# Patient Record
Sex: FEMALE | Race: WHITE
[De-identification: names, ages, dates, MRNs, and addresses within clinical notes are randomized per-mention and may not be internally consistent; named-entity substitution may affect disease eponyms.]

---
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
## 2018-01-21 ENCOUNTER — HOSPITAL ENCOUNTER (EMERGENCY)
Dept: HOSPITAL 53 - M ED | Age: 18
Discharge: HOME | End: 2018-01-21
Payer: COMMERCIAL

## 2018-01-21 DIAGNOSIS — N94.6: ICD-10-CM

## 2018-01-21 DIAGNOSIS — N93.9: Primary | ICD-10-CM

## 2018-01-21 DIAGNOSIS — K21.9: ICD-10-CM

## 2018-01-21 DIAGNOSIS — Z79.899: ICD-10-CM

## 2018-01-21 RX ADMIN — ONDANSETRON 1 MG: 4 TABLET, ORALLY DISINTEGRATING ORAL at 18:12

## 2018-01-21 RX ADMIN — KETOROLAC TROMETHAMINE 1 MG: 30 INJECTION, SOLUTION INTRAMUSCULAR at 18:12

## 2018-02-11 ENCOUNTER — HOSPITAL ENCOUNTER (EMERGENCY)
Dept: HOSPITAL 53 - M ED | Age: 18
LOS: 1 days | Discharge: HOME | End: 2018-02-12
Payer: COMMERCIAL

## 2018-02-11 DIAGNOSIS — F41.1: Primary | ICD-10-CM

## 2018-02-11 DIAGNOSIS — K21.0: ICD-10-CM

## 2018-02-11 DIAGNOSIS — R30.0: ICD-10-CM

## 2018-02-11 DIAGNOSIS — Z79.899: ICD-10-CM

## 2018-02-11 DIAGNOSIS — J45.909: ICD-10-CM

## 2018-02-11 PROCEDURE — 84703 CHORIONIC GONADOTROPIN ASSAY: CPT

## 2018-02-11 RX ADMIN — ACETAMINOPHEN 1 MG: 325 TABLET ORAL at 23:53

## 2018-02-12 LAB
CONTROL LINE UCG: (no result)
LEUKOCYTE ESTERASE UR AUTO RFX: NEGATIVE
SPECIFIC GRAVITY UR AUTO RFX: 1.02 (ref 1–1.03)
SQUAM EPITHELIAL CELL UR AURFX: 4 /HPF (ref 0–6)
URINE PREG TEST: NEGATIVE

## 2018-02-28 ENCOUNTER — HOSPITAL ENCOUNTER (EMERGENCY)
Dept: HOSPITAL 53 - M ED | Age: 18
Discharge: HOME | End: 2018-02-28
Payer: COMMERCIAL

## 2018-02-28 DIAGNOSIS — K21.9: ICD-10-CM

## 2018-02-28 DIAGNOSIS — O20.8: Primary | ICD-10-CM

## 2018-02-28 DIAGNOSIS — O99.511: ICD-10-CM

## 2018-02-28 DIAGNOSIS — O99.611: ICD-10-CM

## 2018-02-28 DIAGNOSIS — J45.909: ICD-10-CM

## 2018-02-28 DIAGNOSIS — Z79.899: ICD-10-CM

## 2018-02-28 DIAGNOSIS — O34.81: ICD-10-CM

## 2018-02-28 DIAGNOSIS — F33.9: ICD-10-CM

## 2018-02-28 DIAGNOSIS — Z3A.01: ICD-10-CM

## 2018-02-28 DIAGNOSIS — O99.341: ICD-10-CM

## 2018-02-28 DIAGNOSIS — N83.202: ICD-10-CM

## 2018-02-28 DIAGNOSIS — F41.9: ICD-10-CM

## 2018-02-28 LAB
ALBUMIN/GLOBULIN RATIO: 1.32 (ref 1–1.93)
ALBUMIN: 4.1 GM/DL (ref 3.2–5.2)
ALKALINE PHOSPHATASE: 77 U/L (ref 45–117)
ALT SERPL W P-5'-P-CCNC: 20 U/L (ref 12–78)
ANION GAP: 10 MEQ/L (ref 8–16)
AST SERPL-CCNC: 16 U/L (ref 7–37)
BASO #: 0 10^3/UL (ref 0–0.2)
BASO %: 0.5 % (ref 0–1)
BILIRUBIN,TOTAL: 0.7 MG/DL (ref 0.2–1)
BLOOD UREA NITROGEN: 5 MG/DL (ref 7–18)
CALCIUM LEVEL: 8.9 MG/DL (ref 8.5–10.1)
CARBON DIOXIDE LEVEL: 21 MEQ/L (ref 21–32)
CHLORIDE LEVEL: 108 MEQ/L (ref 98–107)
CREATININE FOR GFR: 0.59 MG/DL (ref 0.55–1.3)
EOS #: 0.1 10^3/UL (ref 0–0.5)
EOSINOPHIL NFR BLD AUTO: 0.7 % (ref 0–3)
GLUCOSE, FASTING: 93 MG/DL (ref 70–100)
HCG, SERUM QUANTITATIVE: 8955 MIU/ML
HEMATOCRIT: 38.3 % (ref 36–47)
HEMOGLOBIN: 12.8 G/DL (ref 12–16)
IMMATURE GRANULOCYTE %: 0.1 % (ref 0–3)
LEUKOCYTE ESTERASE UR AUTO RFX: NEGATIVE
LYMPH #: 2 10^3/UL (ref 1.5–6.5)
LYMPH %: 25.3 % (ref 24–44)
MEAN CORPUSCULAR HEMOGLOBIN: 28.1 PG (ref 27–33)
MEAN CORPUSCULAR HGB CONC: 33.4 G/DL (ref 32–36.5)
MEAN CORPUSCULAR VOLUME: 84 FL (ref 80–96)
MONO #: 0.8 10^3/UL (ref 0–0.8)
MONO %: 9.7 % (ref 0–5)
NEUTROPHILS #: 5.1 10^3/UL (ref 1.8–7.7)
NEUTROPHILS %: 63.7 % (ref 36–66)
NRBC BLD AUTO-RTO: 0 % (ref 0–0)
PLATELET COUNT, AUTOMATED: 375 10^3/UL (ref 150–450)
POTASSIUM SERUM: 4 MEQ/L (ref 3.5–5.1)
RED BLOOD COUNT: 4.56 10^6/UL (ref 4–5.4)
RED CELL DISTRIBUTION WIDTH: 14.3 % (ref 11.5–14.5)
SODIUM LEVEL: 139 MEQ/L (ref 136–145)
SPECIFIC GRAVITY UR AUTO RFX: 1.02 (ref 1–1.03)
SQUAM EPITHELIAL CELL UR AURFX: 20 /HPF (ref 0–6)
TOTAL PROTEIN: 7.2 GM/DL (ref 6.4–8.2)
WHITE BLOOD COUNT: 8 10^3/UL (ref 4–10)

## 2018-02-28 PROCEDURE — 76801 OB US < 14 WKS SINGLE FETUS: CPT

## 2018-02-28 RX ADMIN — SODIUM CHLORIDE 1 MLS/HR: 9 INJECTION, SOLUTION INTRAVENOUS at 11:30

## 2018-03-02 ENCOUNTER — HOSPITAL ENCOUNTER (EMERGENCY)
Dept: HOSPITAL 53 - M ED | Age: 18
Discharge: HOME | End: 2018-03-02
Payer: COMMERCIAL

## 2018-03-02 DIAGNOSIS — R51: ICD-10-CM

## 2018-03-02 DIAGNOSIS — J45.909: ICD-10-CM

## 2018-03-02 DIAGNOSIS — K21.9: ICD-10-CM

## 2018-03-02 DIAGNOSIS — E16.2: Primary | ICD-10-CM

## 2018-03-02 DIAGNOSIS — Z79.899: ICD-10-CM

## 2018-03-02 LAB — GLUCOSE BLDC GLUCOMTR-MCNC: 68 MG/DL (ref 70–105)

## 2018-03-02 PROCEDURE — 99284 EMERGENCY DEPT VISIT MOD MDM: CPT

## 2018-03-28 ENCOUNTER — HOSPITAL ENCOUNTER (EMERGENCY)
Dept: HOSPITAL 53 - M ED | Age: 18
LOS: 1 days | Discharge: HOME | End: 2018-03-29
Payer: COMMERCIAL

## 2018-03-28 DIAGNOSIS — O99.611: ICD-10-CM

## 2018-03-28 DIAGNOSIS — Z3A.09: ICD-10-CM

## 2018-03-28 DIAGNOSIS — O21.0: ICD-10-CM

## 2018-03-28 DIAGNOSIS — K21.9: ICD-10-CM

## 2018-03-28 DIAGNOSIS — O26.891: Primary | ICD-10-CM

## 2018-03-28 DIAGNOSIS — M94.0: ICD-10-CM

## 2018-03-28 LAB
BASO #: 0.1 10^3/UL (ref 0–0.2)
BASO %: 0.5 % (ref 0–1)
EOS #: 0.2 10^3/UL (ref 0–0.5)
EOSINOPHIL NFR BLD AUTO: 1.4 % (ref 0–3)
HEMATOCRIT: 44.8 % (ref 36–47)
HEMOGLOBIN: 14.5 G/DL (ref 12–16)
IMMATURE GRANULOCYTE %: 0.4 % (ref 0–3)
LYMPH #: 3 10^3/UL (ref 1.5–6.5)
LYMPH %: 27.7 % (ref 24–44)
MEAN CORPUSCULAR HEMOGLOBIN: 27.7 PG (ref 27–33)
MEAN CORPUSCULAR HGB CONC: 32.4 G/DL (ref 32–36.5)
MEAN CORPUSCULAR VOLUME: 85.7 FL (ref 80–96)
MONO #: 1.1 10^3/UL (ref 0–0.8)
MONO %: 10.1 % (ref 0–5)
NEUTROPHILS #: 6.5 10^3/UL (ref 1.8–7.7)
NEUTROPHILS %: 59.9 % (ref 36–66)
NRBC BLD AUTO-RTO: 0 % (ref 0–0)
PLATELET COUNT, AUTOMATED: 373 10^3/UL (ref 150–450)
RED BLOOD COUNT: 5.23 10^6/UL (ref 4–5.4)
RED CELL DISTRIBUTION WIDTH: 14.4 % (ref 11.5–14.5)
WHITE BLOOD COUNT: 10.8 10^3/UL (ref 4–10)

## 2018-03-28 RX ADMIN — ACETAMINOPHEN 1 MG: 325 TABLET ORAL at 23:06

## 2018-03-28 RX ADMIN — METOCLOPRAMIDE 1 MG: 5 INJECTION, SOLUTION INTRAMUSCULAR; INTRAVENOUS at 23:06

## 2018-03-28 RX ADMIN — SODIUM CHLORIDE 1 MLS/HR: 9 INJECTION, SOLUTION INTRAVENOUS at 23:06

## 2018-03-29 LAB
ALBUMIN/GLOBULIN RATIO: 1.19 (ref 1–1.93)
ALBUMIN: 3.7 GM/DL (ref 3.2–5.2)
ALKALINE PHOSPHATASE: 75 U/L (ref 45–117)
ALT SERPL W P-5'-P-CCNC: 46 U/L (ref 12–78)
ANION GAP: 6 MEQ/L (ref 8–16)
AST SERPL-CCNC: 26 U/L (ref 7–37)
BILIRUBIN,TOTAL: 0.5 MG/DL (ref 0.2–1)
BLOOD UREA NITROGEN: 7 MG/DL (ref 7–18)
CALCIUM LEVEL: 8.6 MG/DL (ref 8.5–10.1)
CARBON DIOXIDE LEVEL: 24 MEQ/L (ref 21–32)
CHLORIDE LEVEL: 108 MEQ/L (ref 98–107)
CREATININE FOR GFR: 0.59 MG/DL (ref 0.55–1.3)
GLUCOSE, FASTING: 100 MG/DL (ref 70–100)
POTASSIUM SERUM: 3.7 MEQ/L (ref 3.5–5.1)
SODIUM LEVEL: 138 MEQ/L (ref 136–145)
TOTAL PROTEIN: 6.8 GM/DL (ref 6.4–8.2)

## 2018-04-13 ENCOUNTER — HOSPITAL ENCOUNTER (EMERGENCY)
Dept: HOSPITAL 53 - M ED | Age: 18
LOS: 1 days | Discharge: HOME | End: 2018-04-14
Payer: COMMERCIAL

## 2018-04-13 DIAGNOSIS — O99.89: Primary | ICD-10-CM

## 2018-04-13 DIAGNOSIS — Z79.899: ICD-10-CM

## 2018-04-13 DIAGNOSIS — K21.9: ICD-10-CM

## 2018-04-13 DIAGNOSIS — R51: ICD-10-CM

## 2018-04-13 DIAGNOSIS — Z91.018: ICD-10-CM

## 2018-04-13 DIAGNOSIS — F41.9: ICD-10-CM

## 2018-04-13 DIAGNOSIS — Z91.013: ICD-10-CM

## 2018-04-13 DIAGNOSIS — Z3A.12: ICD-10-CM

## 2018-04-13 DIAGNOSIS — O99.341: ICD-10-CM

## 2018-04-13 LAB
ALBUMIN/GLOBULIN RATIO: 0.95 (ref 1–1.93)
ALBUMIN: 3.5 GM/DL (ref 3.2–5.2)
ALKALINE PHOSPHATASE: 72 U/L (ref 45–117)
ALT SERPL W P-5'-P-CCNC: 32 U/L (ref 12–78)
ANION GAP: 7 MEQ/L (ref 8–16)
AST SERPL-CCNC: 19 U/L (ref 7–37)
BASO #: 0 10^3/UL (ref 0–0.2)
BASO %: 0.3 % (ref 0–1)
BILIRUB CONJ SERPL-MCNC: < 0.1 MG/DL (ref 0–0.2)
BILIRUBIN,TOTAL: 0.4 MG/DL (ref 0.2–1)
BLOOD UREA NITROGEN: 7 MG/DL (ref 7–18)
CALCIUM LEVEL: 8.8 MG/DL (ref 8.5–10.1)
CARBON DIOXIDE LEVEL: 25 MEQ/L (ref 21–32)
CHLAMYDIA DNA AMPLIFICATION: NEGATIVE
CHLORIDE LEVEL: 107 MEQ/L (ref 98–107)
CREATININE FOR GFR: 0.53 MG/DL (ref 0.55–1.3)
EOS #: 0.1 10^3/UL (ref 0–0.5)
EOSINOPHIL NFR BLD AUTO: 0.7 % (ref 0–3)
GC DNA AMPLIFICATION: NEGATIVE
GLUCOSE, FASTING: 86 MG/DL (ref 70–100)
HCG, SERUM QUANTITATIVE: (no result) MIU/ML
HEMATOCRIT: 36.2 % (ref 36–47)
HEMOGLOBIN: 12.2 G/DL (ref 12–15.5)
IMMATURE GRANULOCYTE %: 0.3 % (ref 0–3)
LEUKOCYTE ESTERASE UR AUTO RFX: NEGATIVE
LIPASE: 101 U/L (ref 73–393)
LYMPH #: 2.3 10^3/UL (ref 1.5–6.5)
LYMPH %: 20 % (ref 24–44)
MEAN CORPUSCULAR HEMOGLOBIN: 28.5 PG (ref 27–33)
MEAN CORPUSCULAR HGB CONC: 33.7 G/DL (ref 32–36.5)
MEAN CORPUSCULAR VOLUME: 84.6 FL (ref 80–96)
MONO #: 1 10^3/UL (ref 0–0.8)
MONO %: 8.9 % (ref 0–5)
NEUTROPHILS #: 8.2 10^3/UL (ref 1.8–7.7)
NEUTROPHILS %: 69.8 % (ref 36–66)
NRBC BLD AUTO-RTO: 0 % (ref 0–0)
PLATELET COUNT, AUTOMATED: 316 10^3/UL (ref 150–450)
POTASSIUM SERUM: 3.6 MEQ/L (ref 3.5–5.1)
RED BLOOD COUNT: 4.28 10^6/UL (ref 4–5.4)
RED CELL DISTRIBUTION WIDTH: 14.2 % (ref 11.5–14.5)
SODIUM LEVEL: 139 MEQ/L (ref 136–145)
SPECIFIC GRAVITY UR AUTO RFX: 1 (ref 1–1.03)
SQUAM EPITHELIAL CELL UR AURFX: 0 /HPF (ref 0–6)
TOTAL PROTEIN: 7.2 GM/DL (ref 6.4–8.2)
WHITE BLOOD COUNT: 11.7 10^3/UL (ref 4–10)

## 2018-04-13 RX ADMIN — ACETAMINOPHEN 1 MG: 325 TABLET ORAL at 21:37

## 2018-04-13 RX ADMIN — SODIUM CHLORIDE 1 MLS/HR: 9 INJECTION, SOLUTION INTRAVENOUS at 21:37

## 2018-04-13 RX ADMIN — SODIUM CHLORIDE 1 MLS/HR: 9 INJECTION, SOLUTION INTRAVENOUS at 23:15

## 2018-04-13 RX ADMIN — DEXTROSE MONOHYDRATE 1 MG: 50 INJECTION, SOLUTION INTRAVENOUS at 21:37

## 2018-05-08 ENCOUNTER — HOSPITAL ENCOUNTER (EMERGENCY)
Dept: HOSPITAL 53 - M ED | Age: 18
Discharge: HOME | End: 2018-05-08
Payer: COMMERCIAL

## 2018-05-08 DIAGNOSIS — K21.9: ICD-10-CM

## 2018-05-08 DIAGNOSIS — O99.612: ICD-10-CM

## 2018-05-08 DIAGNOSIS — O99.512: ICD-10-CM

## 2018-05-08 DIAGNOSIS — Z3A.16: ICD-10-CM

## 2018-05-08 DIAGNOSIS — J45.909: ICD-10-CM

## 2018-05-08 DIAGNOSIS — Z91.018: ICD-10-CM

## 2018-05-08 DIAGNOSIS — Z91.013: ICD-10-CM

## 2018-05-08 DIAGNOSIS — Z79.899: ICD-10-CM

## 2018-05-08 DIAGNOSIS — O21.9: Primary | ICD-10-CM

## 2018-05-08 LAB
ALBUMIN/GLOBULIN RATIO: 0.97 (ref 1–1.93)
ALBUMIN: 3.2 GM/DL (ref 3.2–5.2)
ALKALINE PHOSPHATASE: 79 U/L (ref 45–117)
ALT SERPL W P-5'-P-CCNC: 31 U/L (ref 12–78)
ANION GAP: 9 MEQ/L (ref 8–16)
AST SERPL-CCNC: 21 U/L (ref 7–37)
BASO #: 0.1 10^3/UL (ref 0–0.2)
BASO %: 0.5 % (ref 0–1)
BILIRUB CONJ SERPL-MCNC: 0.1 MG/DL (ref 0–0.2)
BILIRUBIN,TOTAL: 0.4 MG/DL (ref 0.2–1)
BLOOD UREA NITROGEN: 4 MG/DL (ref 7–18)
CALCIUM LEVEL: 8.8 MG/DL (ref 8.5–10.1)
CARBON DIOXIDE LEVEL: 23 MEQ/L (ref 21–32)
CHLORIDE LEVEL: 109 MEQ/L (ref 98–107)
CREATININE FOR GFR: 0.54 MG/DL (ref 0.55–1.3)
EOS #: 0.1 10^3/UL (ref 0–0.5)
EOSINOPHIL NFR BLD AUTO: 1 % (ref 0–3)
GLUCOSE, FASTING: 97 MG/DL (ref 70–100)
HEMATOCRIT: 35.3 % (ref 36–47)
HEMOGLOBIN: 12 G/DL (ref 12–15.5)
IMMATURE GRANULOCYTE %: 0.4 % (ref 0–3)
LEUKOCYTE ESTERASE UR AUTO RFX: NEGATIVE
LIPASE: 95 U/L (ref 73–393)
LYMPH #: 2.8 10^3/UL (ref 1.5–6.5)
LYMPH %: 26.6 % (ref 24–44)
MEAN CORPUSCULAR HEMOGLOBIN: 28.5 PG (ref 27–33)
MEAN CORPUSCULAR HGB CONC: 34 G/DL (ref 32–36.5)
MEAN CORPUSCULAR VOLUME: 83.8 FL (ref 80–96)
MONO #: 1 10^3/UL (ref 0–0.8)
MONO %: 9.4 % (ref 0–5)
NEUTROPHILS #: 6.5 10^3/UL (ref 1.8–7.7)
NEUTROPHILS %: 62.1 % (ref 36–66)
NRBC BLD AUTO-RTO: 0 % (ref 0–0)
PLATELET COUNT, AUTOMATED: 292 10^3/UL (ref 150–450)
POTASSIUM SERUM: 3.9 MEQ/L (ref 3.5–5.1)
RED BLOOD COUNT: 4.21 10^6/UL (ref 4–5.4)
RED CELL DISTRIBUTION WIDTH: 13.7 % (ref 11.5–14.5)
SODIUM LEVEL: 141 MEQ/L (ref 136–145)
SPECIFIC GRAVITY UR AUTO RFX: 1 (ref 1–1.03)
SQUAM EPITHELIAL CELL UR AURFX: 2 /HPF (ref 0–6)
TOTAL PROTEIN: 6.5 GM/DL (ref 6.4–8.2)
WHITE BLOOD COUNT: 10.5 10^3/UL (ref 4–10)

## 2018-05-08 RX ADMIN — SODIUM CHLORIDE 1 MLS/HR: 9 INJECTION, SOLUTION INTRAVENOUS at 07:00

## 2018-05-08 RX ADMIN — ACETAMINOPHEN 1 MG: 325 TABLET ORAL at 07:56

## 2018-05-08 RX ADMIN — ONDANSETRON 1 MG: 2 INJECTION INTRAMUSCULAR; INTRAVENOUS at 06:45

## 2018-08-27 ENCOUNTER — HOSPITAL ENCOUNTER (EMERGENCY)
Dept: HOSPITAL 53 - M ED | Age: 18
Discharge: HOME | End: 2018-08-27
Payer: COMMERCIAL

## 2018-08-27 DIAGNOSIS — I45.19: ICD-10-CM

## 2018-08-27 DIAGNOSIS — Z91.018: ICD-10-CM

## 2018-08-27 DIAGNOSIS — F41.9: Primary | ICD-10-CM

## 2018-08-27 DIAGNOSIS — R00.0: ICD-10-CM

## 2018-08-27 DIAGNOSIS — Z79.899: ICD-10-CM

## 2018-08-27 DIAGNOSIS — Z91.013: ICD-10-CM

## 2018-08-27 LAB
ALBUMIN/GLOBULIN RATIO: 0.56 (ref 1–1.93)
ALBUMIN: 2.5 GM/DL (ref 3.2–5.2)
ALKALINE PHOSPHATASE: 133 U/L (ref 45–117)
ALT SERPL W P-5'-P-CCNC: 29 U/L (ref 12–78)
ANION GAP: 10 MEQ/L (ref 8–16)
AST SERPL-CCNC: 28 U/L (ref 7–37)
BASO #: 0.1 10^3/UL (ref 0–0.2)
BASO %: 0.5 % (ref 0–1)
BILIRUB CONJ SERPL-MCNC: < 0.1 MG/DL (ref 0–0.2)
BILIRUBIN,TOTAL: 0.3 MG/DL (ref 0.2–1)
BLOOD UREA NITROGEN: 5 MG/DL (ref 7–18)
CALCIUM LEVEL: 8.2 MG/DL (ref 8.5–10.1)
CARBON DIOXIDE LEVEL: 20 MEQ/L (ref 21–32)
CHLORIDE LEVEL: 109 MEQ/L (ref 98–107)
CK MB CFR.DF SERPL CALC: 1.53
CK SERPL-CCNC: 65 U/L (ref 26–192)
CK-MB VALUE MASS: < 1 NG/ML (ref ?–3.6)
CREATININE FOR GFR: 0.61 MG/DL (ref 0.55–1.3)
EOS #: 0.1 10^3/UL (ref 0–0.5)
EOSINOPHIL NFR BLD AUTO: 0.6 % (ref 0–3)
GLUCOSE, FASTING: 85 MG/DL (ref 70–100)
HEMATOCRIT: 31.2 % (ref 36–47)
HEMOGLOBIN: 10.1 G/DL (ref 12–15.5)
IMMATURE GRANULOCYTE %: 0.9 % (ref 0–3)
INR: 1.02
LEUKOCYTE ESTERASE UR AUTO RFX: NEGATIVE
LIPASE: 111 U/L (ref 73–393)
LYMPH #: 2.1 10^3/UL (ref 1.5–6.5)
LYMPH %: 19.8 % (ref 24–44)
MAGNESIUM LEVEL: 1.9 MG/DL (ref 1.8–2.4)
MEAN CORPUSCULAR HEMOGLOBIN: 27.4 PG (ref 27–33)
MEAN CORPUSCULAR HGB CONC: 32.4 G/DL (ref 32–36.5)
MEAN CORPUSCULAR VOLUME: 84.8 FL (ref 80–96)
MONO #: 1 10^3/UL (ref 0–0.8)
MONO %: 9.2 % (ref 0–5)
NEUTROPHILS #: 7.3 10^3/UL (ref 1.8–7.7)
NEUTROPHILS %: 69 % (ref 36–66)
NRBC BLD AUTO-RTO: 0 % (ref 0–0)
PLATELET COUNT, AUTOMATED: 293 10^3/UL (ref 150–450)
POTASSIUM SERUM: 3.9 MEQ/L (ref 3.5–5.1)
PROTHROMBIN TIME: 13.5 SECONDS (ref 12.1–14.4)
RED BLOOD COUNT: 3.68 10^6/UL (ref 4–5.4)
RED CELL DISTRIBUTION WIDTH: 13 % (ref 11.5–14.5)
SODIUM LEVEL: 139 MEQ/L (ref 136–145)
SPECIFIC GRAVITY UR AUTO RFX: 1.02 (ref 1–1.03)
SQUAM EPITHELIAL CELL UR AURFX: 3 /HPF (ref 0–6)
TOTAL PROTEIN: 7 GM/DL (ref 6.4–8.2)
TROPONIN I: < 0.02 NG/ML (ref ?–0.1)
WHITE BLOOD COUNT: 10.6 10^3/UL (ref 4–10)

## 2018-08-27 RX ADMIN — ONDANSETRON 1 MG: 2 INJECTION INTRAMUSCULAR; INTRAVENOUS at 16:19

## 2018-08-27 RX ADMIN — SODIUM CHLORIDE 1 MLS/HR: 9 INJECTION, SOLUTION INTRAVENOUS at 16:19

## 2018-08-29 ENCOUNTER — HOSPITAL ENCOUNTER (EMERGENCY)
Dept: HOSPITAL 53 - M ED | Age: 18
Discharge: HOME | End: 2018-08-29
Payer: COMMERCIAL

## 2018-08-29 DIAGNOSIS — Z79.899: ICD-10-CM

## 2018-08-29 DIAGNOSIS — Z91.013: ICD-10-CM

## 2018-08-29 DIAGNOSIS — R55: ICD-10-CM

## 2018-08-29 DIAGNOSIS — Z3A.32: ICD-10-CM

## 2018-08-29 DIAGNOSIS — Z91.018: ICD-10-CM

## 2018-08-29 DIAGNOSIS — O99.89: Primary | ICD-10-CM

## 2018-08-29 PROCEDURE — 93005 ELECTROCARDIOGRAM TRACING: CPT

## 2018-09-12 ENCOUNTER — HOSPITAL ENCOUNTER (EMERGENCY)
Dept: HOSPITAL 53 - M ED | Age: 18
Discharge: HOME | End: 2018-09-12
Payer: COMMERCIAL

## 2018-09-12 DIAGNOSIS — O88.813: Primary | ICD-10-CM

## 2018-09-12 DIAGNOSIS — R94.31: ICD-10-CM

## 2018-09-12 DIAGNOSIS — Z3A.34: ICD-10-CM

## 2018-09-12 DIAGNOSIS — O99.511: ICD-10-CM

## 2018-09-12 DIAGNOSIS — Z79.899: ICD-10-CM

## 2018-09-12 DIAGNOSIS — Z91.013: ICD-10-CM

## 2018-09-12 DIAGNOSIS — R00.0: ICD-10-CM

## 2018-09-12 DIAGNOSIS — Z91.018: ICD-10-CM

## 2018-09-12 LAB
ALBUMIN/GLOBULIN RATIO: 0.69 (ref 1–1.93)
ALBUMIN: 2.7 GM/DL (ref 3.2–5.2)
ALKALINE PHOSPHATASE: 150 U/L (ref 45–117)
ALT SERPL W P-5'-P-CCNC: 26 U/L (ref 12–78)
ANION GAP: 8 MEQ/L (ref 8–16)
AST SERPL-CCNC: 26 U/L (ref 7–37)
BASO #: 0 10^3/UL (ref 0–0.2)
BASO %: 0.3 % (ref 0–1)
BILIRUBIN,TOTAL: 0.5 MG/DL (ref 0.2–1)
BLOOD UREA NITROGEN: 6 MG/DL (ref 7–18)
CALCIUM LEVEL: 8.7 MG/DL (ref 8.5–10.1)
CARBON DIOXIDE LEVEL: 23 MEQ/L (ref 21–32)
CHLORIDE LEVEL: 107 MEQ/L (ref 98–107)
CK MB CFR.DF SERPL CALC: 2.27
CK SERPL-CCNC: 44 U/L (ref 26–192)
CK-MB VALUE MASS: < 1 NG/ML (ref ?–3.6)
CREATININE FOR GFR: 0.61 MG/DL (ref 0.55–1.3)
EOS #: 0 10^3/UL (ref 0–0.5)
EOSINOPHIL NFR BLD AUTO: 0.4 % (ref 0–3)
FT4I SERPL CALC-MCNC: 2.9 % (ref 1.3–4.8)
GLUCOSE, FASTING: 91 MG/DL (ref 70–100)
HEMATOCRIT: 32 % (ref 36–47)
HEMOGLOBIN: 10.1 G/DL (ref 12–15.5)
IMMATURE GRANULOCYTE %: 0.8 % (ref 0–3)
LEUKOCYTE ESTERASE UR AUTO RFX: NEGATIVE
LYMPH #: 1.7 10^3/UL (ref 1.5–6.5)
LYMPH %: 15.8 % (ref 24–44)
MEAN CORPUSCULAR HEMOGLOBIN: 26.9 PG (ref 27–33)
MEAN CORPUSCULAR HGB CONC: 31.6 G/DL (ref 32–36.5)
MEAN CORPUSCULAR VOLUME: 85.1 FL (ref 80–96)
MONO #: 1.1 10^3/UL (ref 0–0.8)
MONO %: 10.4 % (ref 0–5)
NEUTROPHILS #: 7.8 10^3/UL (ref 1.8–7.7)
NEUTROPHILS %: 72.3 % (ref 36–66)
NRBC BLD AUTO-RTO: 0 % (ref 0–0)
PLATELET COUNT, AUTOMATED: 316 10^3/UL (ref 150–450)
POTASSIUM SERUM: 3.8 MEQ/L (ref 3.5–5.1)
RED BLOOD COUNT: 3.76 10^6/UL (ref 4–5.4)
RED CELL DISTRIBUTION WIDTH: 13.2 % (ref 11.5–14.5)
SODIUM LEVEL: 138 MEQ/L (ref 136–145)
SPECIFIC GRAVITY UR AUTO RFX: 1.01 (ref 1–1.03)
SQUAM EPITHELIAL CELL UR AURFX: 1 /HPF (ref 0–6)
T UPTAKE: 21 % (ref 30–39)
THYROID STIMULATING HORMONE: 1.83 UIU/ML (ref 0.46–3.98)
THYROXINE (T4): 13.6 UG/DL (ref 6–11.6)
TOTAL PROTEIN: 6.6 GM/DL (ref 6.4–8.2)
TROPONIN I: < 0.02 NG/ML (ref ?–0.1)
WHITE BLOOD COUNT: 10.8 10^3/UL (ref 4–10)

## 2018-09-12 RX ADMIN — ENOXAPARIN SODIUM 1 MG: 80 INJECTION SUBCUTANEOUS at 21:30

## 2018-09-12 RX ADMIN — ALBUTEROL SULFATE 1 PUFF: 90 AEROSOL, METERED RESPIRATORY (INHALATION) at 22:20

## 2018-09-12 RX ADMIN — SODIUM CHLORIDE 1 MLS/HR: 9 INJECTION, SOLUTION INTRAVENOUS at 18:16

## 2018-09-17 ENCOUNTER — HOSPITAL ENCOUNTER (INPATIENT)
Dept: HOSPITAL 53 - M LDI | Age: 18
LOS: 1 days | Discharge: HOME | DRG: 781 | End: 2018-09-18
Attending: OBSTETRICS & GYNECOLOGY | Admitting: OBSTETRICS & GYNECOLOGY
Payer: COMMERCIAL

## 2018-09-17 DIAGNOSIS — Z79.01: ICD-10-CM

## 2018-09-17 DIAGNOSIS — Z86.711: ICD-10-CM

## 2018-09-17 DIAGNOSIS — O99.343: ICD-10-CM

## 2018-09-17 DIAGNOSIS — O36.8330: Primary | ICD-10-CM

## 2018-09-17 DIAGNOSIS — F32.9: ICD-10-CM

## 2018-09-17 DIAGNOSIS — F41.0: ICD-10-CM

## 2018-09-17 DIAGNOSIS — Z3A.35: ICD-10-CM

## 2018-09-17 LAB
ALBUMIN/GLOBULIN RATIO: 0.56 (ref 1–1.93)
ALBUMIN: 2.4 GM/DL (ref 3.2–5.2)
ALKALINE PHOSPHATASE: 144 U/L (ref 45–117)
ALT SERPL W P-5'-P-CCNC: 20 U/L (ref 12–78)
ANION GAP: 13 MEQ/L (ref 8–16)
AST SERPL-CCNC: 32 U/L (ref 7–37)
BILIRUBIN,TOTAL: 0.3 MG/DL (ref 0.2–1)
BLOOD UREA NITROGEN: 4 MG/DL (ref 7–18)
CALCIUM LEVEL: 8.6 MG/DL (ref 8.5–10.1)
CARBON DIOXIDE LEVEL: 19 MEQ/L (ref 21–32)
CHLORIDE LEVEL: 109 MEQ/L (ref 98–107)
CREATININE FOR GFR: 0.62 MG/DL (ref 0.55–1.3)
FIBRINOGEN: 527 MG/DL (ref 221–452)
GLUCOSE, FASTING: 89 MG/DL (ref 70–100)
HEMATOCRIT: 29.7 % (ref 36–47)
HEMOGLOBIN: 9.4 G/DL (ref 12–15.5)
INR: 0.92
MEAN CORPUSCULAR HEMOGLOBIN: 26.2 PG (ref 27–33)
MEAN CORPUSCULAR HGB CONC: 31.6 G/DL (ref 32–36.5)
MEAN CORPUSCULAR VOLUME: 82.7 FL (ref 80–96)
NRBC BLD AUTO-RTO: 0 % (ref 0–0)
PARTIAL THROMBOPLASTIN TIME: 28.6 SECONDS (ref 25.4–37.6)
PLATELET COUNT, AUTOMATED: 268 10^3/UL (ref 150–450)
POTASSIUM SERUM: 3.4 MEQ/L (ref 3.5–5.1)
PROTHROMBIN TIME: 12.5 SECONDS (ref 12.1–14.4)
RED BLOOD COUNT: 3.59 10^6/UL (ref 4–5.4)
RED CELL DISTRIBUTION WIDTH: 13.1 % (ref 11.5–14.5)
SODIUM LEVEL: 141 MEQ/L (ref 136–145)
SYPHILIS: NEGATIVE
TOTAL PROTEIN: 6.7 GM/DL (ref 6.4–8.2)
WHITE BLOOD COUNT: 10.7 10^3/UL (ref 4–10)

## 2018-09-17 RX ADMIN — SODIUM CHLORIDE, POTASSIUM CHLORIDE, SODIUM LACTATE AND CALCIUM CHLORIDE 1 MLS/HR: 600; 310; 30; 20 INJECTION, SOLUTION INTRAVENOUS at 14:49

## 2018-09-17 RX ADMIN — SODIUM CHLORIDE, POTASSIUM CHLORIDE, SODIUM LACTATE AND CALCIUM CHLORIDE 1 ML: 600; 310; 30; 20 INJECTION, SOLUTION INTRAVENOUS at 13:51

## 2018-09-18 RX ADMIN — SODIUM CHLORIDE 1 UNITS: 4.5 INJECTION, SOLUTION INTRAVENOUS at 06:54

## 2018-10-15 ENCOUNTER — HOSPITAL ENCOUNTER (INPATIENT)
Dept: HOSPITAL 53 - M LDI | Age: 18
LOS: 4 days | Discharge: HOME | End: 2018-10-19
Attending: OBSTETRICS & GYNECOLOGY | Admitting: OBSTETRICS & GYNECOLOGY
Payer: COMMERCIAL

## 2018-10-15 DIAGNOSIS — J45.909: ICD-10-CM

## 2018-10-15 DIAGNOSIS — Z79.01: ICD-10-CM

## 2018-10-15 DIAGNOSIS — I26.99: ICD-10-CM

## 2018-10-15 DIAGNOSIS — Z3A.39: ICD-10-CM

## 2018-10-15 LAB
BASO #: 0 10^3/UL (ref 0–0.2)
BASO %: 0.5 % (ref 0–1)
EOS #: 0 10^3/UL (ref 0–0.5)
EOSINOPHIL NFR BLD AUTO: 0.4 % (ref 0–3)
HEMATOCRIT: 28.8 % (ref 36–47)
HEMOGLOBIN: 8.9 G/DL (ref 12–15.5)
IMMATURE GRANULOCYTE %: 1.2 % (ref 0–3)
LYMPH #: 2.2 10^3/UL (ref 1.5–6.5)
LYMPH %: 26.7 % (ref 24–44)
MEAN CORPUSCULAR HEMOGLOBIN: 24.7 PG (ref 27–33)
MEAN CORPUSCULAR HGB CONC: 30.9 G/DL (ref 32–36.5)
MEAN CORPUSCULAR VOLUME: 79.8 FL (ref 80–96)
MONO #: 0.9 10^3/UL (ref 0–0.8)
MONO %: 10.9 % (ref 0–5)
NEUTROPHILS #: 5 10^3/UL (ref 1.8–7.7)
NEUTROPHILS %: 60.3 % (ref 36–66)
NRBC BLD AUTO-RTO: 0 % (ref 0–0)
PLATELET COUNT, AUTOMATED: 317 10^3/UL (ref 150–450)
RED BLOOD COUNT: 3.61 10^6/UL (ref 4–5.4)
RED CELL DISTRIBUTION WIDTH: 14.5 % (ref 11.5–14.5)
SYPHILIS: NONREACTIVE
WHITE BLOOD COUNT: 8.3 10^3/UL (ref 4–10)

## 2018-10-15 PROCEDURE — 3E0P7GC INTRODUCTION OF OTHER THERAPEUTIC SUBSTANCE INTO FEMALE REPRODUCTIVE, VIA NATURAL OR ARTIFICIAL OPENING: ICD-10-PCS

## 2018-10-15 RX ADMIN — NALBUPHINE HYDROCHLORIDE 1 MG: 10 INJECTION, SOLUTION INTRAMUSCULAR; INTRAVENOUS; SUBCUTANEOUS at 23:21

## 2018-10-15 RX ADMIN — MISOPROSTOL 1 MCG: 100 TABLET ORAL at 17:04

## 2018-10-15 RX ADMIN — PROMETHAZINE HYDROCHLORIDE 1 MG: 25 INJECTION INTRAMUSCULAR; INTRAVENOUS at 23:21

## 2018-10-15 RX ADMIN — SODIUM CHLORIDE, POTASSIUM CHLORIDE, SODIUM LACTATE AND CALCIUM CHLORIDE 1 ML: 600; 310; 30; 20 INJECTION, SOLUTION INTRAVENOUS at 14:48

## 2018-10-16 RX ADMIN — SODIUM CHLORIDE, POTASSIUM CHLORIDE, SODIUM LACTATE AND CALCIUM CHLORIDE 1 MLS/HR: 600; 310; 30; 20 INJECTION, SOLUTION INTRAVENOUS at 09:33

## 2018-10-16 RX ADMIN — Medication 1 MG: at 11:31

## 2018-10-16 RX ADMIN — Medication 1 MG: at 11:21

## 2018-10-16 RX ADMIN — SODIUM CHLORIDE, POTASSIUM CHLORIDE, SODIUM LACTATE AND CALCIUM CHLORIDE 1 MLS/HR: 600; 310; 30; 20 INJECTION, SOLUTION INTRAVENOUS at 18:29

## 2018-10-16 RX ADMIN — SODIUM CHLORIDE, POTASSIUM CHLORIDE, SODIUM LACTATE AND CALCIUM CHLORIDE 1 MLS/HR: 600; 310; 30; 20 INJECTION, SOLUTION INTRAVENOUS at 11:44

## 2018-10-16 RX ADMIN — Medication 1 MLS/HR: at 09:57

## 2018-10-16 RX ADMIN — Medication 1 MLS/HR: at 08:42

## 2018-10-16 RX ADMIN — NALBUPHINE HYDROCHLORIDE 1 MG: 10 INJECTION, SOLUTION INTRAMUSCULAR; INTRAVENOUS; SUBCUTANEOUS at 03:00

## 2018-10-16 RX ADMIN — SODIUM CHLORIDE, POTASSIUM CHLORIDE, SODIUM LACTATE AND CALCIUM CHLORIDE 1 ML: 600; 310; 30; 20 INJECTION, SOLUTION INTRAVENOUS at 11:22

## 2018-10-17 PROCEDURE — 0UQG7ZZ REPAIR VAGINA, VIA NATURAL OR ARTIFICIAL OPENING: ICD-10-PCS

## 2018-10-17 RX ADMIN — Medication 1 MLS/HR: at 10:48

## 2018-10-17 RX ADMIN — DOCUSATE SODIUM 1 MG: 100 CAPSULE, LIQUID FILLED ORAL at 20:14

## 2018-10-17 RX ADMIN — Medication 1 TAB: at 09:00

## 2018-10-17 RX ADMIN — DOCUSATE SODIUM 1 MG: 100 CAPSULE, LIQUID FILLED ORAL at 09:00

## 2018-10-17 RX ADMIN — ENOXAPARIN SODIUM 1 MG: 80 INJECTION SUBCUTANEOUS at 18:33

## 2018-10-17 RX ADMIN — IBUPROFEN 1 MG: 800 TABLET, FILM COATED ORAL at 20:14

## 2018-10-17 RX ADMIN — ACETAMINOPHEN 1 MG: 325 TABLET ORAL at 04:45

## 2018-10-17 RX ADMIN — IBUPROFEN 1 MG: 800 TABLET, FILM COATED ORAL at 10:48

## 2018-10-17 RX ADMIN — ACETAMINOPHEN 1 MG: 325 TABLET ORAL at 22:15

## 2018-10-18 RX ADMIN — ACETAMINOPHEN 1 MG: 325 TABLET ORAL at 22:01

## 2018-10-18 RX ADMIN — ENOXAPARIN SODIUM 1 MG: 80 INJECTION SUBCUTANEOUS at 17:46

## 2018-10-18 RX ADMIN — IBUPROFEN 1 MG: 800 TABLET, FILM COATED ORAL at 17:00

## 2018-10-18 RX ADMIN — IBUPROFEN 1 MG: 800 TABLET, FILM COATED ORAL at 03:19

## 2018-10-18 RX ADMIN — Medication 1 TAB: at 08:22

## 2018-10-18 RX ADMIN — ACETAMINOPHEN 1 MG: 325 TABLET ORAL at 08:22

## 2018-10-18 RX ADMIN — DOCUSATE SODIUM 1 MG: 100 CAPSULE, LIQUID FILLED ORAL at 21:00

## 2018-10-18 RX ADMIN — DOCUSATE SODIUM 1 MG: 100 CAPSULE, LIQUID FILLED ORAL at 08:22

## 2018-10-18 RX ADMIN — ENOXAPARIN SODIUM 1 MG: 80 INJECTION SUBCUTANEOUS at 05:42

## 2018-10-19 RX ADMIN — DIBUCAINE 1 DOSE: 1 OINTMENT TOPICAL at 10:44

## 2018-10-19 RX ADMIN — DOCUSATE SODIUM 1 MG: 100 CAPSULE, LIQUID FILLED ORAL at 08:10

## 2018-10-19 RX ADMIN — IBUPROFEN 1 MG: 800 TABLET, FILM COATED ORAL at 04:55

## 2018-10-19 RX ADMIN — ENOXAPARIN SODIUM 1 MG: 80 INJECTION SUBCUTANEOUS at 06:37

## 2018-10-19 RX ADMIN — Medication 1 TAB: at 08:09

## 2018-10-19 RX ADMIN — ACETAMINOPHEN 1 MG: 325 TABLET ORAL at 08:09

## 2018-10-20 ENCOUNTER — HOSPITAL ENCOUNTER (EMERGENCY)
Dept: HOSPITAL 53 - M ED | Age: 18
Discharge: LEFT BEFORE DISCHARGE | End: 2018-10-20
Payer: COMMERCIAL

## 2018-10-20 DIAGNOSIS — R50.9: Primary | ICD-10-CM

## 2018-10-20 DIAGNOSIS — Z53.21: ICD-10-CM

## 2018-11-06 ENCOUNTER — HOSPITAL ENCOUNTER (EMERGENCY)
Dept: HOSPITAL 53 - M ED | Age: 18
Discharge: HOME | End: 2018-11-06
Payer: COMMERCIAL

## 2018-11-06 DIAGNOSIS — Z91.013: ICD-10-CM

## 2018-11-06 DIAGNOSIS — Z91.018: ICD-10-CM

## 2018-11-06 DIAGNOSIS — K59.00: Primary | ICD-10-CM

## 2018-11-06 DIAGNOSIS — Z79.01: ICD-10-CM

## 2018-11-06 DIAGNOSIS — N93.9: ICD-10-CM

## 2018-11-06 PROCEDURE — 99283 EMERGENCY DEPT VISIT LOW MDM: CPT

## 2018-11-06 RX ADMIN — SODIUM PHOSPHATE, DIBASIC AND SODIUM PHOSPHATE, MONOBASIC 1 EA: 7; 19 ENEMA RECTAL at 21:47

## 2019-01-27 ENCOUNTER — HOSPITAL ENCOUNTER (EMERGENCY)
Dept: HOSPITAL 53 - M ED | Age: 19
Discharge: HOME | End: 2019-01-27
Payer: COMMERCIAL

## 2019-01-27 VITALS — HEIGHT: 62 IN | BODY MASS INDEX: 25.45 KG/M2 | WEIGHT: 138.3 LBS

## 2019-01-27 VITALS — DIASTOLIC BLOOD PRESSURE: 57 MMHG | SYSTOLIC BLOOD PRESSURE: 102 MMHG

## 2019-01-27 DIAGNOSIS — N92.1: Primary | ICD-10-CM

## 2019-01-27 LAB
HCG UR QL: NEGATIVE
HCT VFR BLD AUTO: 41.9 % (ref 36–47)
HGB BLD-MCNC: 13.5 G/DL (ref 12–15.5)
MCH RBC QN AUTO: 27.1 PG (ref 27–33)
MCHC RBC AUTO-ENTMCNC: 32.2 G/DL (ref 32–36.5)
MCV RBC AUTO: 84.1 FL (ref 80–96)
PLATELET # BLD AUTO: 413 10^3/UL (ref 150–450)
RBC # BLD AUTO: 4.98 10^6/UL (ref 4–5.4)
WBC # BLD AUTO: 8.3 10^3/UL (ref 4–10)

## 2019-02-26 ENCOUNTER — HOSPITAL ENCOUNTER (EMERGENCY)
Dept: HOSPITAL 53 - M ED | Age: 19
LOS: 1 days | Discharge: HOME | End: 2019-02-27
Payer: COMMERCIAL

## 2019-02-26 VITALS — HEIGHT: 62 IN | WEIGHT: 140.3 LBS | BODY MASS INDEX: 25.82 KG/M2

## 2019-02-26 DIAGNOSIS — Z91.013: ICD-10-CM

## 2019-02-26 DIAGNOSIS — R07.89: Primary | ICD-10-CM

## 2019-02-26 DIAGNOSIS — K92.9: ICD-10-CM

## 2019-02-26 DIAGNOSIS — Z79.01: ICD-10-CM

## 2019-02-26 DIAGNOSIS — Z91.018: ICD-10-CM

## 2019-02-26 DIAGNOSIS — K21.9: ICD-10-CM

## 2019-02-26 DIAGNOSIS — F41.1: ICD-10-CM

## 2019-02-26 DIAGNOSIS — J98.4: ICD-10-CM

## 2019-02-26 DIAGNOSIS — Z86.711: ICD-10-CM

## 2019-02-26 PROCEDURE — 84484 ASSAY OF TROPONIN QUANT: CPT

## 2019-02-26 PROCEDURE — 82550 ASSAY OF CK (CPK): CPT

## 2019-02-26 PROCEDURE — 93005 ELECTROCARDIOGRAM TRACING: CPT

## 2019-02-26 PROCEDURE — 80048 BASIC METABOLIC PNL TOTAL CA: CPT

## 2019-02-26 PROCEDURE — 94760 N-INVAS EAR/PLS OXIMETRY 1: CPT

## 2019-02-26 PROCEDURE — 99285 EMERGENCY DEPT VISIT HI MDM: CPT

## 2019-02-26 PROCEDURE — 82553 CREATINE MB FRACTION: CPT

## 2019-02-26 PROCEDURE — 71275 CT ANGIOGRAPHY CHEST: CPT

## 2019-02-26 PROCEDURE — 85025 COMPLETE CBC W/AUTO DIFF WBC: CPT

## 2019-02-26 PROCEDURE — 84703 CHORIONIC GONADOTROPIN ASSAY: CPT

## 2019-02-26 PROCEDURE — 93041 RHYTHM ECG TRACING: CPT

## 2019-02-27 VITALS — DIASTOLIC BLOOD PRESSURE: 71 MMHG | SYSTOLIC BLOOD PRESSURE: 118 MMHG

## 2019-02-27 LAB
B-HCG SERPL QL: NEGATIVE
BASOPHILS # BLD AUTO: 0.1 10^3/UL (ref 0–0.2)
BASOPHILS NFR BLD AUTO: 0.9 % (ref 0–1)
BUN SERPL-MCNC: 12 MG/DL (ref 7–18)
CALCIUM SERPL-MCNC: 8.9 MG/DL (ref 8.5–10.1)
CHLORIDE SERPL-SCNC: 108 MEQ/L (ref 98–107)
CK MB CFR.DF SERPL CALC: 1.43
CK MB SERPL-MCNC: < 1 NG/ML (ref ?–3.6)
CK SERPL-CCNC: 70 U/L (ref 26–192)
CO2 SERPL-SCNC: 29 MEQ/L (ref 21–32)
CREAT SERPL-MCNC: 0.77 MG/DL (ref 0.55–1.3)
EOSINOPHIL # BLD AUTO: 0.1 10^3/UL (ref 0–0.5)
EOSINOPHIL NFR BLD AUTO: 1.3 % (ref 0–3)
GFR SERPL CREATININE-BSD FRML MDRD: > 60 ML/MIN/{1.73_M2} (ref 60–?)
GLUCOSE SERPL-MCNC: 73 MG/DL (ref 70–100)
HCT VFR BLD AUTO: 40.4 % (ref 36–47)
HGB BLD-MCNC: 13 G/DL (ref 12–15.5)
LYMPHOCYTES # BLD AUTO: 3.6 10^3/UL (ref 1.5–6.5)
LYMPHOCYTES NFR BLD AUTO: 39.5 % (ref 24–44)
MCH RBC QN AUTO: 27.3 PG (ref 27–33)
MCHC RBC AUTO-ENTMCNC: 32.2 G/DL (ref 32–36.5)
MCV RBC AUTO: 84.9 FL (ref 80–96)
MONOCYTES # BLD AUTO: 0.9 10^3/UL (ref 0–0.8)
MONOCYTES NFR BLD AUTO: 10.1 % (ref 0–5)
NEUTROPHILS # BLD AUTO: 4.3 10^3/UL (ref 1.8–7.7)
NEUTROPHILS NFR BLD AUTO: 48 % (ref 36–66)
PLATELET # BLD AUTO: 385 10^3/UL (ref 150–450)
POTASSIUM SERPL-SCNC: 3.6 MEQ/L (ref 3.5–5.1)
RBC # BLD AUTO: 4.76 10^6/UL (ref 4–5.4)
SODIUM SERPL-SCNC: 143 MEQ/L (ref 136–145)
TROPONIN I SERPL-MCNC: < 0.02 NG/ML (ref ?–0.1)
WBC # BLD AUTO: 9 10^3/UL (ref 4–10)

## 2019-02-27 NOTE — ECGEPIP
Stationary ECG Study

                           [facility] - ED

                                       

                                       Test Date:    2019

Pat Name:     OJEY CAREY          Department:   

Patient ID:   [id number]                 Room:         -

Gender:       F                        Technician:   SHAHRZAD

:          2000               Requested By: VINCENZO CUADRA

Order Number: [id number]     Reading MD:   Brigette Willingham

                                 Measurements

Intervals                              Axis          

Rate:         68                       P:            57

MN:           159                      QRS:          19

QRSD:         84                       T:            31

QT:           383                                    

QTc:          408                                    

                           Interpretive Statements

SINUS RHYTHM WITH SINUS ARRHYTHMIA

NONSPECIFIC T-WAVE ABNORMALITY

DECREASED RATE 18

Electronically Signed On 2019 20:53:54 EST by Brigette Willingham

## 2019-02-27 NOTE — REPVR
EXAM: 

 CT Angiography Chest With Contrast 



EXAM DATE/TIME: 

 2/27/2019 12:22 AM 



CLINICAL HISTORY: 

 19 years old, female; Chest pain; R/O PE 



TECHNIQUE: 

 Axial computed tomographic angiography images of the chest with intravenous 

contrast using CT angiography protocol. 

 All CT scans at this facility use at least one of these dose optimization 

techniques: automated exposure control; mA and/or kV adjustment per patient 

size (includes targeted exams where dose is matched to clinical indication); or 

iterative reconstruction. 

 Coronal and sagittal reformatted images were created and reviewed. 

 MIP reconstructed images were created and reviewed. 



CONTRAST: 

 Contrast Material: 75 ml of iso; Contrast Route: ac 



COMPARISON: 

 CT ANGIO CHEST 9/12/2018 7:57 PM 



FINDINGS: 

 Pulmonary arteries:  No pulmonary embolism is identified. The previously noted 

pulmonary embolism in the lingula described in the CTA chest on 9/12/2018 has 

resolved. 

 Aorta: There is no thoracic aortic aneurysm, pseudoaneurysm, penetrating 

atherosclerotic ulcer, or dissection. 

 Lungs: The lungs are clear. There is no lung consolidation, pulmonary infarct, 

or mass. No emphysematous changes or interstitial lung disease is noted. The 

major airways are patent. 

 Pleural space: Normal. No pneumothorax. No pleural effusion. 

 Heart: No cardiomegaly. No pericardial effusion. The ratio of the diameter of 

the right ventricle to the diameter of the left ventricle measures less than 1, 

which is within normal limits and there is no evidence for a right ventricular 

strain. 

 Mediastinum: No mediastinal mass, hemorrhage, or pneumomediastinum is noted. 

 Gallbladder and bile ducts:  The gallbladder is contracted. No calcified 

gallstones are seen. No gallbladder wall thickening, pericholecystic fluid, or 

pericholecystic inflammatory changes are identified. No dilation of the 

intrahepatic or extrahepatic bile ducts is noted. 

 Spleen:  Unremarkable. No splenomegaly is noted. 

 Adrenals:  Normal. No mass. 

 Lymph nodes: Normal. No enlarged lymph nodes. 

 Bones/joints: The imaged bony structures are intact. There is no suspicious 

osteolytic or osteoblastic lesion. 

 Soft tissues: Unremarkable. 



IMPRESSION: 

No acute findings in the chest. No pulmonary embolism. 



Electronically signed by: Alfredo Brooke On 02/27/2019  01:56:10 AM

## 2019-07-26 ENCOUNTER — HOSPITAL ENCOUNTER (EMERGENCY)
Dept: HOSPITAL 53 - M ED | Age: 19
Discharge: HOME | End: 2019-07-26
Payer: COMMERCIAL

## 2019-07-26 VITALS — SYSTOLIC BLOOD PRESSURE: 98 MMHG | DIASTOLIC BLOOD PRESSURE: 54 MMHG

## 2019-07-26 VITALS — WEIGHT: 135.28 LBS | HEIGHT: 62 IN | BODY MASS INDEX: 24.89 KG/M2

## 2019-07-26 DIAGNOSIS — Z91.018: ICD-10-CM

## 2019-07-26 DIAGNOSIS — R00.1: ICD-10-CM

## 2019-07-26 DIAGNOSIS — Z79.899: ICD-10-CM

## 2019-07-26 DIAGNOSIS — Z91.013: ICD-10-CM

## 2019-07-26 DIAGNOSIS — Z79.51: ICD-10-CM

## 2019-07-26 DIAGNOSIS — R07.89: Primary | ICD-10-CM

## 2019-07-26 LAB
B-HCG SERPL QL: NEGATIVE
BASOPHILS # BLD AUTO: 0 10^3/UL (ref 0–0.2)
BASOPHILS NFR BLD AUTO: 0.5 % (ref 0–1)
BUN SERPL-MCNC: 12 MG/DL (ref 7–18)
CALCIUM SERPL-MCNC: 8.8 MG/DL (ref 8.5–10.1)
CHLORIDE SERPL-SCNC: 108 MEQ/L (ref 98–107)
CK MB CFR.DF SERPL CALC: 1.09
CK MB SERPL-MCNC: < 1 NG/ML (ref ?–3.6)
CK SERPL-CCNC: 92 U/L (ref 26–192)
CO2 SERPL-SCNC: 27 MEQ/L (ref 21–32)
CREAT SERPL-MCNC: 0.74 MG/DL (ref 0.55–1.3)
EOSINOPHIL # BLD AUTO: 0.1 10^3/UL (ref 0–0.5)
EOSINOPHIL NFR BLD AUTO: 1.3 % (ref 0–3)
GFR SERPL CREATININE-BSD FRML MDRD: > 60 ML/MIN/{1.73_M2} (ref 60–?)
GLUCOSE SERPL-MCNC: 87 MG/DL (ref 70–100)
HCT VFR BLD AUTO: 36.4 % (ref 36–47)
HGB BLD-MCNC: 11.8 G/DL (ref 12–15.5)
LYMPHOCYTES # BLD AUTO: 3.5 10^3/UL (ref 1.5–6.5)
LYMPHOCYTES NFR BLD AUTO: 41.9 % (ref 24–44)
MCH RBC QN AUTO: 28 PG (ref 27–33)
MCHC RBC AUTO-ENTMCNC: 32.4 G/DL (ref 32–36.5)
MCV RBC AUTO: 86.3 FL (ref 80–96)
MONOCYTES # BLD AUTO: 0.7 10^3/UL (ref 0–0.8)
MONOCYTES NFR BLD AUTO: 8.6 % (ref 0–5)
NEUTROPHILS # BLD AUTO: 3.9 10^3/UL (ref 1.8–7.7)
NEUTROPHILS NFR BLD AUTO: 47.6 % (ref 36–66)
PLATELET # BLD AUTO: 335 10^3/UL (ref 150–450)
POTASSIUM SERPL-SCNC: 3.9 MEQ/L (ref 3.5–5.1)
RBC # BLD AUTO: 4.22 10^6/UL (ref 4–5.4)
SODIUM SERPL-SCNC: 142 MEQ/L (ref 136–145)
T4 FREE SERPL-MCNC: 0.99 NG/DL (ref 0.76–1.46)
TROPONIN I SERPL-MCNC: < 0.02 NG/ML (ref ?–0.1)
TSH SERPL DL<=0.005 MIU/L-ACNC: 3.05 UIU/ML (ref 0.36–3.74)
WBC # BLD AUTO: 8.3 10^3/UL (ref 4–10)

## 2019-07-26 NOTE — REP
Clinical:  Acute chest pain .

 

Comparison: None .

 

Technique:  PA and lateral.

 

Findings:

The mediastinum and cardiac silhouette are normal.  The lung fields are clear and

without acute consolidation, effusion, or pneumothorax.  The skeletal structures

are intact and normal.

 

Impression:

1.   No acute cardiopulmonary process.

 

 

Electronically Signed by

Johan García MD 07/26/2019 06:44 A

## 2019-07-26 NOTE — ECGEPIP
[facility] - ED

                                       

                                       Test Date:    2019

Pat Name:     JOEY CAREY          Department:   

Patient ID:   [id number]                 Room:         -

Gender:       Female                   Technician:   AK

:          2000               Requested By: VINCENZO CUADRA

Order Number: [id number]     Reading MD:   Silvio Rust

                                 Measurements

Intervals                              Axis          

Rate:         56                       P:            59

AL:           143                      QRS:          16

QRSD:         91                       T:            32

QT:           409                                    

QTc:          397                                    

                           Interpretive Statements

SINUS BRADYCARDIA

SIMILAR TO 19

Electronically Signed on 2019 6:31:55 EDT by Silvio Rust

## 2019-10-07 ENCOUNTER — HOSPITAL ENCOUNTER (EMERGENCY)
Dept: HOSPITAL 53 - M ED | Age: 19
LOS: 1 days | Discharge: HOME | End: 2019-10-08
Payer: COMMERCIAL

## 2019-10-07 VITALS — WEIGHT: 128.09 LBS | BODY MASS INDEX: 24.18 KG/M2 | HEIGHT: 61 IN

## 2019-10-07 DIAGNOSIS — G43.909: ICD-10-CM

## 2019-10-07 DIAGNOSIS — R09.1: Primary | ICD-10-CM

## 2019-10-07 DIAGNOSIS — J45.909: ICD-10-CM

## 2019-10-07 DIAGNOSIS — Z91.018: ICD-10-CM

## 2019-10-07 DIAGNOSIS — Z86.711: ICD-10-CM

## 2019-10-07 DIAGNOSIS — Z91.013: ICD-10-CM

## 2019-10-07 LAB
APTT BLD: 31.5 SECONDS (ref 25–38.4)
B-HCG SERPL QL: NEGATIVE
BASOPHILS # BLD AUTO: 0.1 10^3/UL (ref 0–0.2)
BASOPHILS NFR BLD AUTO: 0.8 % (ref 0–1)
BUN SERPL-MCNC: 12 MG/DL (ref 7–18)
CALCIUM SERPL-MCNC: 8.8 MG/DL (ref 8.5–10.1)
CHLORIDE SERPL-SCNC: 107 MEQ/L (ref 98–107)
CK MB CFR.DF SERPL CALC: 1.54
CK MB SERPL-MCNC: < 1 NG/ML (ref ?–3.6)
CK SERPL-CCNC: 65 U/L (ref 26–192)
CO2 SERPL-SCNC: 26 MEQ/L (ref 21–32)
CREAT SERPL-MCNC: 0.9 MG/DL (ref 0.55–1.3)
EOSINOPHIL # BLD AUTO: 0.1 10^3/UL (ref 0–0.5)
EOSINOPHIL NFR BLD AUTO: 1.3 % (ref 0–3)
GFR SERPL CREATININE-BSD FRML MDRD: > 60 ML/MIN/{1.73_M2} (ref 60–?)
GLUCOSE SERPL-MCNC: 102 MG/DL (ref 70–100)
HCT VFR BLD AUTO: 40.4 % (ref 36–47)
HGB BLD-MCNC: 13.3 G/DL (ref 12–15.5)
INR PPP: 1.13
LYMPHOCYTES # BLD AUTO: 2.9 10^3/UL (ref 1.5–5)
LYMPHOCYTES NFR BLD AUTO: 38.1 % (ref 24–44)
MCH RBC QN AUTO: 28.4 PG (ref 27–33)
MCHC RBC AUTO-ENTMCNC: 32.9 G/DL (ref 32–36.5)
MCV RBC AUTO: 86.1 FL (ref 80–96)
MONOCYTES # BLD AUTO: 0.9 10^3/UL (ref 0–0.8)
MONOCYTES NFR BLD AUTO: 11.3 % (ref 0–5)
NEUTROPHILS # BLD AUTO: 3.7 10^3/UL (ref 1.5–8.5)
NEUTROPHILS NFR BLD AUTO: 48.4 % (ref 36–66)
PLATELET # BLD AUTO: 335 10^3/UL (ref 150–450)
POTASSIUM SERPL-SCNC: 4.1 MEQ/L (ref 3.5–5.1)
PROTHROMBIN TIME: 14.2 SECONDS (ref 11.8–14)
RBC # BLD AUTO: 4.69 10^6/UL (ref 4–5.4)
SODIUM SERPL-SCNC: 141 MEQ/L (ref 136–145)
TROPONIN I SERPL-MCNC: < 0.02 NG/ML (ref ?–0.1)
WBC # BLD AUTO: 7.6 10^3/UL (ref 4–10)

## 2019-10-07 PROCEDURE — 84703 CHORIONIC GONADOTROPIN ASSAY: CPT

## 2019-10-07 PROCEDURE — 93005 ELECTROCARDIOGRAM TRACING: CPT

## 2019-10-07 PROCEDURE — 85025 COMPLETE CBC W/AUTO DIFF WBC: CPT

## 2019-10-07 PROCEDURE — 71275 CT ANGIOGRAPHY CHEST: CPT

## 2019-10-07 PROCEDURE — 85730 THROMBOPLASTIN TIME PARTIAL: CPT

## 2019-10-07 PROCEDURE — 80048 BASIC METABOLIC PNL TOTAL CA: CPT

## 2019-10-07 PROCEDURE — 85610 PROTHROMBIN TIME: CPT

## 2019-10-07 PROCEDURE — 36415 COLL VENOUS BLD VENIPUNCTURE: CPT

## 2019-10-07 PROCEDURE — 96375 TX/PRO/DX INJ NEW DRUG ADDON: CPT

## 2019-10-07 PROCEDURE — 84484 ASSAY OF TROPONIN QUANT: CPT

## 2019-10-07 PROCEDURE — 96374 THER/PROPH/DIAG INJ IV PUSH: CPT

## 2019-10-07 PROCEDURE — 99284 EMERGENCY DEPT VISIT MOD MDM: CPT

## 2019-10-07 PROCEDURE — 82553 CREATINE MB FRACTION: CPT

## 2019-10-07 PROCEDURE — 82550 ASSAY OF CK (CPK): CPT

## 2019-10-08 VITALS — DIASTOLIC BLOOD PRESSURE: 50 MMHG | SYSTOLIC BLOOD PRESSURE: 94 MMHG

## 2019-10-08 NOTE — ECGEPIP
[facility] - ED

                                       

                                       Test Date:    2019-10-07

Pat Name:     JOEY CAREY          Department:   

Patient ID:   [id number]                 Room:         -

Gender:       Female                   Technician:   JBLANCHE

:          2000               Requested By: LEORA HUNTER 

Order Number: [id number]     Reading MD:   All Esparza

                                 Measurements

Intervals                              Axis          

Rate:         82                       P:            67

KS:           143                      QRS:          32

QRSD:         86                       T:            66

QT:           358                                    

QTc:          419                                    

                           Interpretive Statements

SINUS RHYTHM WITH SINUS ARRHYTHMIA

Incomplete right bundle branch block

Similar to tracing done 18 but with decreased rate

Electronically Signed on 10-8-2019 1:06:55 EDT by All Esparza

## 2020-03-16 ENCOUNTER — HOSPITAL ENCOUNTER (EMERGENCY)
Dept: HOSPITAL 53 - M ED | Age: 20
LOS: 1 days | Discharge: HOME | End: 2020-03-17
Payer: COMMERCIAL

## 2020-03-16 VITALS — HEIGHT: 62 IN | WEIGHT: 133.38 LBS | BODY MASS INDEX: 24.54 KG/M2

## 2020-03-16 DIAGNOSIS — R07.89: Primary | ICD-10-CM

## 2020-03-16 DIAGNOSIS — Z86.711: ICD-10-CM

## 2020-03-16 DIAGNOSIS — M79.89: ICD-10-CM

## 2020-03-16 DIAGNOSIS — R06.02: ICD-10-CM

## 2020-03-16 DIAGNOSIS — L03.011: ICD-10-CM

## 2020-03-16 DIAGNOSIS — Z79.899: ICD-10-CM

## 2020-03-16 DIAGNOSIS — J45.909: ICD-10-CM

## 2020-03-16 DIAGNOSIS — Z91.013: ICD-10-CM

## 2020-03-16 DIAGNOSIS — R42: ICD-10-CM

## 2020-03-16 DIAGNOSIS — Z91.018: ICD-10-CM

## 2020-03-17 VITALS — DIASTOLIC BLOOD PRESSURE: 69 MMHG | SYSTOLIC BLOOD PRESSURE: 108 MMHG

## 2020-03-17 LAB
BASOPHILS # BLD AUTO: 0.1 10^3/UL (ref 0–0.2)
BASOPHILS NFR BLD AUTO: 0.7 % (ref 0–1)
CK MB CFR.DF SERPL CALC: 1.16
CK MB SERPL-MCNC: < 1 NG/ML (ref ?–3.6)
CK SERPL-CCNC: 86 U/L (ref 26–192)
EOSINOPHIL # BLD AUTO: 0.1 10^3/UL (ref 0–0.5)
EOSINOPHIL NFR BLD AUTO: 1.2 % (ref 0–3)
HCT VFR BLD AUTO: 40.4 % (ref 36–47)
HGB BLD-MCNC: 13.1 G/DL (ref 12–15.5)
LYMPHOCYTES # BLD AUTO: 4 10^3/UL (ref 1.5–5)
LYMPHOCYTES NFR BLD AUTO: 35.3 % (ref 24–44)
MCH RBC QN AUTO: 28 PG (ref 27–33)
MCHC RBC AUTO-ENTMCNC: 32.4 G/DL (ref 32–36.5)
MCV RBC AUTO: 86.3 FL (ref 80–96)
MONOCYTES # BLD AUTO: 1.2 10^3/UL (ref 0–0.8)
MONOCYTES NFR BLD AUTO: 10.7 % (ref 0–5)
NEUTROPHILS # BLD AUTO: 5.8 10^3/UL (ref 1.5–8.5)
NEUTROPHILS NFR BLD AUTO: 51.8 % (ref 36–66)
PLATELET # BLD AUTO: 366 10^3/UL (ref 150–450)
RBC # BLD AUTO: 4.68 10^6/UL (ref 4–5.4)
TROPONIN I SERPL-MCNC: < 0.02 NG/ML (ref ?–0.1)
WBC # BLD AUTO: 11.2 10^3/UL (ref 4–10)

## 2020-03-17 NOTE — REP
Clinical:  Chest pain .

 

Comparison: 07/26/2019 .

 

Technique:  PA and lateral.

 

Findings:

The mediastinum and cardiac silhouette are normal.  The lung fields are clear and

without acute consolidation, effusion, or pneumothorax.  The skeletal structures

are intact and normal.

 

Impression:

1.   No acute cardiopulmonary process.

 

 

Electronically Signed by

Johan García MD 03/17/2020 06:43 A

## 2020-03-17 NOTE — ECGEPIP
[facility] - ED

                                       

                                       Test Date:    2020

Pat Name:     JOEY CAREY          Department:   

Patient ID:   [id number]                 Room:         -

Gender:       Female                   Technician:   SB

:          2000               Requested By: KRYSTA STORM PA-C

Order Number: [id number]     Reading MD:   Brigette Willingham

                                 Measurements

Intervals                              Axis          

Rate:         63                       P:            62

CT:           145                      QRS:          38

QRSD:         80                       T:            45

QT:           386                                    

QTc:          396                                    

                           Interpretive Statements

SINUS RHYTHM

Electronically Signed on 3- 20:11:44 EDT by Brigette Willingham

## 2020-03-23 ENCOUNTER — HOSPITAL ENCOUNTER (EMERGENCY)
Dept: HOSPITAL 53 - M ED | Age: 20
Discharge: HOME | End: 2020-03-23
Payer: COMMERCIAL

## 2020-03-23 VITALS — SYSTOLIC BLOOD PRESSURE: 112 MMHG | DIASTOLIC BLOOD PRESSURE: 64 MMHG

## 2020-03-23 VITALS — HEIGHT: 62 IN | WEIGHT: 134.48 LBS | BODY MASS INDEX: 24.75 KG/M2

## 2020-03-23 DIAGNOSIS — G43.909: ICD-10-CM

## 2020-03-23 DIAGNOSIS — I45.19: ICD-10-CM

## 2020-03-23 DIAGNOSIS — R06.02: ICD-10-CM

## 2020-03-23 DIAGNOSIS — Z79.899: ICD-10-CM

## 2020-03-23 DIAGNOSIS — Z82.49: ICD-10-CM

## 2020-03-23 DIAGNOSIS — F32.9: ICD-10-CM

## 2020-03-23 DIAGNOSIS — J45.909: ICD-10-CM

## 2020-03-23 DIAGNOSIS — Z86.711: ICD-10-CM

## 2020-03-23 DIAGNOSIS — F41.9: ICD-10-CM

## 2020-03-23 DIAGNOSIS — Z91.018: ICD-10-CM

## 2020-03-23 DIAGNOSIS — R07.89: Primary | ICD-10-CM

## 2020-03-23 DIAGNOSIS — Z91.013: ICD-10-CM

## 2020-03-23 LAB
ALBUMIN SERPL BCG-MCNC: 4 GM/DL (ref 3.2–5.2)
ALT SERPL W P-5'-P-CCNC: 40 U/L (ref 12–78)
BASOPHILS # BLD AUTO: 0.1 10^3/UL (ref 0–0.2)
BASOPHILS NFR BLD AUTO: 0.7 % (ref 0–1)
BILIRUB CONJ SERPL-MCNC: 0.2 MG/DL (ref 0–0.2)
BILIRUB SERPL-MCNC: 1 MG/DL (ref 0.2–1)
CK MB CFR.DF SERPL CALC: 1.27
CK MB SERPL-MCNC: < 1 NG/ML (ref ?–3.6)
CK SERPL-CCNC: 79 U/L (ref 26–192)
EOSINOPHIL # BLD AUTO: 0.1 10^3/UL (ref 0–0.5)
EOSINOPHIL NFR BLD AUTO: 0.8 % (ref 0–3)
HCT VFR BLD AUTO: 38.9 % (ref 36–47)
HGB BLD-MCNC: 12.6 G/DL (ref 12–15.5)
LIPASE SERPL-CCNC: 112 U/L (ref 73–393)
LYMPHOCYTES # BLD AUTO: 2 10^3/UL (ref 1.5–5)
LYMPHOCYTES NFR BLD AUTO: 26.8 % (ref 24–44)
MCH RBC QN AUTO: 27.8 PG (ref 27–33)
MCHC RBC AUTO-ENTMCNC: 32.4 G/DL (ref 32–36.5)
MCV RBC AUTO: 85.9 FL (ref 80–96)
MONOCYTES # BLD AUTO: 0.7 10^3/UL (ref 0–0.8)
MONOCYTES NFR BLD AUTO: 9 % (ref 0–5)
NEUTROPHILS # BLD AUTO: 4.6 10^3/UL (ref 1.5–8.5)
NEUTROPHILS NFR BLD AUTO: 62.6 % (ref 36–66)
NT-PRO BNP: 39 PG/ML (ref ?–125)
PLATELET # BLD AUTO: 331 10^3/UL (ref 150–450)
PROT SERPL-MCNC: 7.6 GM/DL (ref 6.4–8.2)
RBC # BLD AUTO: 4.53 10^6/UL (ref 4–5.4)
TROPONIN I SERPL-MCNC: < 0.02 NG/ML (ref ?–0.1)
WBC # BLD AUTO: 7.3 10^3/UL (ref 4–10)

## 2020-03-24 NOTE — ECGEPIP
[facility] - ED

                                       

                                       Test Date:    2020

Pat Name:     JOEY CAREY          Department:   

Patient ID:   [id number]                 Room:         -

Gender:       Female                   Technician:   DEEPTI

:          2000               Requested By: CHELA ELMORE PA-C

Order Number: [id number]     Reading MD:   Silvio Rust

                                 Measurements

Intervals                              Axis          

Rate:         92                       P:            71

OK:           151                      QRS:          36

QRSD:         93                       T:            61

QT:           358                                    

QTc:          445                                    

                           Interpretive Statements

SINUS RHYTHM

INCOMPLETE RIGHT BUNDLE BRANCH BLOCK

NONSPECIFIC T-WAVE ABNORMALITY

Electronically Signed on 3- 5:38:43 EDT by Silvio Rust

## 2020-05-19 ENCOUNTER — HOSPITAL ENCOUNTER (EMERGENCY)
Dept: HOSPITAL 53 - M ED | Age: 20
LOS: 1 days | Discharge: HOME | End: 2020-05-20
Payer: COMMERCIAL

## 2020-05-19 VITALS — HEIGHT: 62 IN | BODY MASS INDEX: 23.97 KG/M2 | WEIGHT: 130.27 LBS

## 2020-05-19 DIAGNOSIS — R93.89: ICD-10-CM

## 2020-05-19 DIAGNOSIS — N83.202: Primary | ICD-10-CM

## 2020-05-19 DIAGNOSIS — Z34.01: ICD-10-CM

## 2020-05-19 DIAGNOSIS — Z91.018: ICD-10-CM

## 2020-05-19 LAB
APPEARANCE UR: CLEAR
BACTERIA UR QL AUTO: NEGATIVE
BILIRUB UR QL STRIP.AUTO: NEGATIVE
GLUCOSE UR QL STRIP.AUTO: NEGATIVE MG/DL
HCT VFR BLD AUTO: 37.4 % (ref 36–47)
HGB BLD-MCNC: 12.3 G/DL (ref 12–15.5)
HGB UR QL STRIP.AUTO: NEGATIVE
KETONES UR QL STRIP.AUTO: NEGATIVE MG/DL
LEUKOCYTE ESTERASE UR QL STRIP.AUTO: (no result)
MCH RBC QN AUTO: 28 PG (ref 27–33)
MCHC RBC AUTO-ENTMCNC: 32.9 G/DL (ref 32–36.5)
MCV RBC AUTO: 85 FL (ref 80–96)
NITRITE UR QL STRIP.AUTO: NEGATIVE
PH UR STRIP.AUTO: 6 UNITS (ref 5–9)
PLATELET # BLD AUTO: 348 10^3/UL (ref 150–450)
PROT UR QL STRIP.AUTO: NEGATIVE MG/DL
RBC # BLD AUTO: 4.4 10^6/UL (ref 4–5.4)
RBC # UR AUTO: 1 /HPF (ref 0–3)
SP GR UR STRIP.AUTO: 1 (ref 1–1.03)
SQUAMOUS #/AREA URNS AUTO: 2 /HPF (ref 0–6)
UROBILINOGEN UR QL STRIP.AUTO: 0.2 MG/DL (ref 0–2)
WBC # BLD AUTO: 10.2 10^3/UL (ref 4–10)
WBC #/AREA URNS AUTO: 1 /HPF (ref 0–3)

## 2020-05-20 VITALS — DIASTOLIC BLOOD PRESSURE: 55 MMHG | SYSTOLIC BLOOD PRESSURE: 105 MMHG

## 2020-05-20 LAB
ALBUMIN SERPL BCG-MCNC: 3.9 GM/DL (ref 3.2–5.2)
ALT SERPL W P-5'-P-CCNC: 38 U/L (ref 12–78)
B-HCG SERPL QL: POSITIVE
B-HCG SERPL-ACNC: 159 MIU/ML
BILIRUB SERPL-MCNC: 0.6 MG/DL (ref 0.2–1)
BUN SERPL-MCNC: 13 MG/DL (ref 7–18)
CALCIUM SERPL-MCNC: 8.5 MG/DL (ref 8.5–10.1)
CHLORIDE SERPL-SCNC: 105 MEQ/L (ref 98–107)
CO2 SERPL-SCNC: 26 MEQ/L (ref 21–32)
CREAT SERPL-MCNC: 0.69 MG/DL (ref 0.55–1.3)
GFR SERPL CREATININE-BSD FRML MDRD: > 60 ML/MIN/{1.73_M2} (ref 60–?)
GLUCOSE SERPL-MCNC: 79 MG/DL (ref 70–100)
POTASSIUM SERPL-SCNC: 4 MEQ/L (ref 3.5–5.1)
PROT SERPL-MCNC: 7.7 GM/DL (ref 6.4–8.2)
SODIUM SERPL-SCNC: 139 MEQ/L (ref 136–145)

## 2020-05-20 NOTE — REPVR
PROCEDURE INFORMATION: 

Exam: US Pregnancy First Trimester, Transabdominal and US Pregnancy, 

Transvaginal 

Exam date and time: 5/20/2020 1:50 AM 

Age: 20 years old 

Clinical indication: Pregnancy complicated by abdominal or pelvic pain; Left 

lower quadrant; First trimester; Gestational age or lmp: 4/21/20; Pregnant; 

Additional info: Llq pain, hcg+, R/O ovary cyst, ectopic 



TECHNIQUE: 

Imaging protocol: Real-time transabdominal obstetrical ultrasound of the 

maternal pelvis and a first trimester pregnancy, less than 14 weeks 0 days, 

with image documentation. Transvaginal imaging was used for better evaluation 

of the fetus and adnexa. 



COMPARISON: 

No relevant prior studies available. 



FINDINGS: 

Gestation: No definitive intrauterine gestational sac is visualized. Ectopic 

pregnancy cannot be excluded. 

Heart rate: N/A. 

Placenta: N/A. 

Amniotic fluid: N/A. 



BIOMETRY: 

Estimated gestational age: N/A. 



MATERNAL: 

Uterus: The uterus measures 12.3 x 5.7 x 5.9 cm. The endometrial stripe 

measures 1.5 cm, which is thickened. There is abnormal increased echogenicity 

within the anterior uterine wall. This measures approximately 2.8 x 2.1 x 1.9 

cm. Adenomyosis is considered, although a uterine mass cannot be excluded. 

Cervix: Unremarkable, as visualized. 

Right adnexa: The right ovary measures 2.6 x 1.8 x 3.0 cm. There is 

preservation of blood flow within the right ovary. Small follicles are 

visualized within the right ovary. 

Left adnexa: The left ovary measures 4.1 x 2.5 x 2.5 cm. There is preservation 

of blood flow within the left ovary. Within the left ovary, there is a complex 

area of heterogeneous ultrasonography removed during 2.8 x 2.1 x 1.9 cm. 

Posterior acoustic enhancement is visualized, suggestive of a cystic component. 

Intraperitoneal: No intraperitoneal free fluid. 



IMPRESSION: 

1. No definitive intrauterine gestational sac is visualized. Ectopic pregnancy 

cannot be excluded. Correlation with serial hCG levels and follow-up 

ultrasonography are recommended. 

2. There is abnormal increased echogenicity within the anterior uterine wall. 

This measures approximately 2.8 x 2.1 x 1.9 cm. Adenomyosis is considered, 

although a uterine mass cannot be excluded. 

3. The endometrial stripe measures 1.5 cm, which is thickened. 

4. Within the left ovary, there is a complex area of heterogeneous 

ultrasonography removed during 2.8 x 2.1 x 1.9 cm. Posterior acoustic 

enhancement is visualized, suggestive of a cystic component. Follow-up 

ultrasonography recommended. 



Electronically signed by: Lester Jones On 05/20/2020  02:13:14 AM

## 2020-06-11 ENCOUNTER — HOSPITAL ENCOUNTER (OUTPATIENT)
Dept: HOSPITAL 53 - M RAD | Age: 20
End: 2020-06-11
Attending: OBSTETRICS & GYNECOLOGY
Payer: COMMERCIAL

## 2020-06-11 DIAGNOSIS — R10.9: ICD-10-CM

## 2020-06-11 DIAGNOSIS — O26.91: Primary | ICD-10-CM

## 2020-06-11 DIAGNOSIS — Z3A.01: ICD-10-CM

## 2020-06-11 NOTE — REP
TRANSVAGINAL OB ULTRASOUND:

 

Transvaginal ultrasound of the pelvis is performed.

 

There is a single living intrauterine gestation with an estimated gestational age

of 7 weeks 1 day based on crown-rump length of 10 mm, EDC 01/27/2021.  Fetal

heart rate 153 beats per minute.  There is no subchorionic hemorrhage.  Complex

cystic structure in the left ovary probably represents a complex corpus luteum

1.3 cm in diameter.  With duplex Doppler evaluation, there is no evidence of

ovarian torsion bilaterally.  Once again in the anterior myometrium there is a

4.7 cm area of heterogeneous mixed echogenicity which may represent fibroid or

adenomyosis.

 

 

Electronically Signed by

Sin Montes De Oca MD 06/16/2020 06:00 P

## 2020-06-16 ENCOUNTER — HOSPITAL ENCOUNTER (EMERGENCY)
Dept: HOSPITAL 53 - M ED | Age: 20
Discharge: HOME | End: 2020-06-16
Payer: COMMERCIAL

## 2020-06-16 VITALS — HEIGHT: 62 IN | WEIGHT: 133.6 LBS | BODY MASS INDEX: 24.59 KG/M2

## 2020-06-16 VITALS — DIASTOLIC BLOOD PRESSURE: 65 MMHG | SYSTOLIC BLOOD PRESSURE: 118 MMHG

## 2020-06-16 DIAGNOSIS — Z3A.01: ICD-10-CM

## 2020-06-16 DIAGNOSIS — Z79.899: ICD-10-CM

## 2020-06-16 DIAGNOSIS — R07.9: ICD-10-CM

## 2020-06-16 DIAGNOSIS — O99.411: Primary | ICD-10-CM

## 2020-06-16 DIAGNOSIS — Z91.018: ICD-10-CM

## 2020-06-16 DIAGNOSIS — Z91.013: ICD-10-CM

## 2020-06-16 LAB
BASOPHILS # BLD AUTO: 0.1 10^3/UL (ref 0–0.2)
BASOPHILS NFR BLD AUTO: 0.5 % (ref 0–1)
EOSINOPHIL # BLD AUTO: 0.1 10^3/UL (ref 0–0.5)
EOSINOPHIL NFR BLD AUTO: 0.8 % (ref 0–3)
HCT VFR BLD AUTO: 36.3 % (ref 36–47)
HGB BLD-MCNC: 11.8 G/DL (ref 12–15.5)
LYMPHOCYTES # BLD AUTO: 2.4 10^3/UL (ref 1.5–5)
LYMPHOCYTES NFR BLD AUTO: 21.9 % (ref 24–44)
MCH RBC QN AUTO: 28.4 PG (ref 27–33)
MCHC RBC AUTO-ENTMCNC: 32.5 G/DL (ref 32–36.5)
MCV RBC AUTO: 87.5 FL (ref 80–96)
MONOCYTES # BLD AUTO: 1 10^3/UL (ref 0–0.8)
MONOCYTES NFR BLD AUTO: 9.1 % (ref 0–5)
NEUTROPHILS # BLD AUTO: 7.5 10^3/UL (ref 1.5–8.5)
NEUTROPHILS NFR BLD AUTO: 67.3 % (ref 36–66)
PLATELET # BLD AUTO: 320 10^3/UL (ref 150–450)
RBC # BLD AUTO: 4.15 10^6/UL (ref 4–5.4)
WBC # BLD AUTO: 11.1 10^3/UL (ref 4–10)

## 2020-06-16 NOTE — REP
REASON:  Chest pain.

 

COMPARISON:  03/17/2020

 

FINDINGS:  The technique utilized in obtaining the radiograph has magnified the

cardiac silhouette and accentuated the interstitial markings.

 

The superior mediastinal structures are midline.  The cardiac silhouette is

unremarkable in size, shape, and position. The diaphragmatic surfaces of the

lungs are regular, and the costophrenic angles are clear.  The pulmonary fields

are clear.  The imaged osseous structures are intact.

 

IMPRESSION:

 

There is no acute cardiopulmonary disease.

 

 

Electronically Signed by

Zeferino Choudhary DO 06/17/2020 01:22 P

## 2020-06-16 NOTE — ECGEPIP
[facility] - ED

                                       

                                       Test Date:    2020

Pat Name:     JOEY CAREY          Department:   

Patient ID:   [id number]                 Room:         -

Gender:       Female                   Technician:   lola

:          2000               Requested By: Brigette Willingham 

Order Number: [id number]     Reading MD:   Silvio Rust

                                 Measurements

Intervals                              Axis          

Rate:         68                       P:            64

OR:           145                      QRS:          11

QRSD:         84                       T:            48

QT:           371                                    

QTc:          396                                    

                           Interpretive Statements

SINUS RHYTHM

POSSIBLE INCOMPLETE RIGHT BUNDLE BRANCH BLOCK

SIMILAR TO 3/23/20

Electronically Signed on 2020 21:46:28 EDT by Silvio Rust

## 2020-06-16 NOTE — REPVR
PROCEDURE INFORMATION: 

Exam: US Duplex Lower Extremity Veins, Bilateral 

Exam date and time: 6/16/2020 5:57 PM 

Age: 20 years old 

Clinical indication: Other: Chest pain earlier in the day; Additional info: 

Pregnant chest pain 



TECHNIQUE: 

Imaging protocol: Real-time duplex ultrasound of the extremities with 2-D gray 

scale, color Doppler flow and spectral waveform analysis with image 

documentation. Complete exam focused on the bilateral lower extremity veins. 



COMPARISON: 

No relevant prior studies available. 



FINDINGS: 

Right deep veins: Unremarkable. The common femoral, femoral, proximal profunda 

femoral and popliteal veins are patent without thrombus. Normal Doppler 

waveforms. Normal compressibility and/or augmentation response.  

Right superficial veins: Saphenofemoral junction is patent without thrombus. 



Left deep veins: Unremarkable. The common femoral, femoral, proximal profunda 

femoral and popliteal veins are patent without thrombus. Normal Doppler 

waveforms. Normal compressibility and/or augmentation response.  

Left superficial veins: Saphenofemoral junction is patent without thrombus. 



Soft tissues: Unremarkable. 



IMPRESSION: 

No DVT of bilateral lower extremities.



Electronically signed by: Cam Mckeon On 06/16/2020  18:14:27 PM

## 2020-08-02 ENCOUNTER — HOSPITAL ENCOUNTER (EMERGENCY)
Dept: HOSPITAL 53 - M ED | Age: 20
Discharge: HOME | End: 2020-08-02
Payer: COMMERCIAL

## 2020-08-02 DIAGNOSIS — Z91.018: ICD-10-CM

## 2020-08-02 DIAGNOSIS — Z86.711: ICD-10-CM

## 2020-08-02 DIAGNOSIS — O20.9: Primary | ICD-10-CM

## 2020-08-02 DIAGNOSIS — Z79.899: ICD-10-CM

## 2020-08-02 DIAGNOSIS — Z3A.14: ICD-10-CM

## 2020-09-17 LAB
APPEARANCE UR: CLEAR
BACTERIA UR QL AUTO: NEGATIVE
BASOPHILS # BLD AUTO: 0 10^3/UL (ref 0–0.2)
BASOPHILS NFR BLD AUTO: 0.4 % (ref 0–1)
BILIRUB UR QL STRIP.AUTO: NEGATIVE
EOSINOPHIL # BLD AUTO: 0.1 10^3/UL (ref 0–0.5)
EOSINOPHIL NFR BLD AUTO: 0.8 % (ref 0–3)
GLUCOSE UR QL STRIP.AUTO: NEGATIVE MG/DL
HCT VFR BLD AUTO: 35.3 % (ref 36–47)
HGB BLD-MCNC: 11.6 G/DL (ref 12–15.5)
HGB UR QL STRIP.AUTO: (no result)
KETONES UR QL STRIP.AUTO: NEGATIVE MG/DL
LEUKOCYTE ESTERASE UR QL STRIP.AUTO: NEGATIVE
LYMPHOCYTES # BLD AUTO: 2 10^3/UL (ref 1.5–5)
LYMPHOCYTES NFR BLD AUTO: 22 % (ref 24–44)
MCH RBC QN AUTO: 28.4 PG (ref 27–33)
MCHC RBC AUTO-ENTMCNC: 32.9 G/DL (ref 32–36.5)
MCV RBC AUTO: 86.5 FL (ref 80–96)
MONOCYTES # BLD AUTO: 0.8 10^3/UL (ref 0–0.8)
MONOCYTES NFR BLD AUTO: 9.1 % (ref 0–5)
NEUTROPHILS # BLD AUTO: 6.1 10^3/UL (ref 1.5–8.5)
NEUTROPHILS NFR BLD AUTO: 67.3 % (ref 36–66)
NITRITE UR QL STRIP.AUTO: NEGATIVE
PH UR STRIP.AUTO: 6 UNITS (ref 5–9)
PLATELET # BLD AUTO: 300 10^3/UL (ref 150–450)
PROT UR QL STRIP.AUTO: NEGATIVE MG/DL
RBC # BLD AUTO: 4.08 10^6/UL (ref 4–5.4)
RBC # UR AUTO: 3 /HPF (ref 0–3)
SP GR UR STRIP.AUTO: 1.01 (ref 1–1.03)
SQUAMOUS #/AREA URNS AUTO: 0 /HPF (ref 0–6)
UROBILINOGEN UR QL STRIP.AUTO: 0.2 MG/DL (ref 0–2)
WBC # BLD AUTO: 9 10^3/UL (ref 4–10)
WBC #/AREA URNS AUTO: 0 /HPF (ref 0–3)

## 2020-09-28 ENCOUNTER — HOSPITAL ENCOUNTER (EMERGENCY)
Dept: HOSPITAL 53 - M ED | Age: 20
Discharge: HOME | End: 2020-09-28
Payer: COMMERCIAL

## 2020-09-28 VITALS — DIASTOLIC BLOOD PRESSURE: 52 MMHG | SYSTOLIC BLOOD PRESSURE: 98 MMHG

## 2020-09-28 VITALS — WEIGHT: 148.15 LBS | BODY MASS INDEX: 27.97 KG/M2 | HEIGHT: 61 IN

## 2020-09-28 DIAGNOSIS — J06.9: ICD-10-CM

## 2020-09-28 DIAGNOSIS — B34.9: ICD-10-CM

## 2020-09-28 DIAGNOSIS — O99.512: Primary | ICD-10-CM

## 2020-09-28 DIAGNOSIS — Z79.899: ICD-10-CM

## 2020-09-28 DIAGNOSIS — O98.512: ICD-10-CM

## 2020-09-28 DIAGNOSIS — Z3A.22: ICD-10-CM

## 2020-10-14 LAB
ALBUMIN SERPL BCG-MCNC: 3.3 GM/DL (ref 3.2–5.2)
ALT SERPL W P-5'-P-CCNC: 20 U/L (ref 12–78)
B-HCG SERPL-ACNC: (no result) MIU/ML
BILIRUB SERPL-MCNC: 0.6 MG/DL (ref 0.2–1)
BUN SERPL-MCNC: 5 MG/DL (ref 7–18)
CALCIUM SERPL-MCNC: 8.3 MG/DL (ref 8.5–10.1)
CHLORIDE SERPL-SCNC: 107 MEQ/L (ref 98–107)
CO2 SERPL-SCNC: 24 MEQ/L (ref 21–32)
CREAT SERPL-MCNC: 0.63 MG/DL (ref 0.55–1.3)
GFR SERPL CREATININE-BSD FRML MDRD: > 60 ML/MIN/{1.73_M2} (ref 60–?)
GLUCOSE SERPL-MCNC: 95 MG/DL (ref 70–100)
POTASSIUM SERPL-SCNC: 3.8 MEQ/L (ref 3.5–5.1)
PROT SERPL-MCNC: 6.8 GM/DL (ref 6.4–8.2)
SODIUM SERPL-SCNC: 136 MEQ/L (ref 136–145)

## 2021-01-23 ENCOUNTER — HOSPITAL ENCOUNTER (INPATIENT)
Dept: HOSPITAL 53 - M LDI | Age: 21
LOS: 3 days | Discharge: HOME | End: 2021-01-26
Attending: OBSTETRICS & GYNECOLOGY | Admitting: OBSTETRICS & GYNECOLOGY
Payer: COMMERCIAL

## 2021-01-23 VITALS — DIASTOLIC BLOOD PRESSURE: 72 MMHG | SYSTOLIC BLOOD PRESSURE: 134 MMHG

## 2021-01-23 VITALS — DIASTOLIC BLOOD PRESSURE: 79 MMHG | SYSTOLIC BLOOD PRESSURE: 133 MMHG

## 2021-01-23 VITALS — DIASTOLIC BLOOD PRESSURE: 65 MMHG | SYSTOLIC BLOOD PRESSURE: 113 MMHG

## 2021-01-23 VITALS — DIASTOLIC BLOOD PRESSURE: 70 MMHG | SYSTOLIC BLOOD PRESSURE: 109 MMHG

## 2021-01-23 VITALS — DIASTOLIC BLOOD PRESSURE: 64 MMHG | SYSTOLIC BLOOD PRESSURE: 132 MMHG

## 2021-01-23 VITALS — SYSTOLIC BLOOD PRESSURE: 112 MMHG | DIASTOLIC BLOOD PRESSURE: 64 MMHG

## 2021-01-23 VITALS — SYSTOLIC BLOOD PRESSURE: 112 MMHG | DIASTOLIC BLOOD PRESSURE: 65 MMHG

## 2021-01-23 VITALS — DIASTOLIC BLOOD PRESSURE: 73 MMHG | SYSTOLIC BLOOD PRESSURE: 117 MMHG

## 2021-01-23 VITALS — SYSTOLIC BLOOD PRESSURE: 119 MMHG | DIASTOLIC BLOOD PRESSURE: 71 MMHG

## 2021-01-23 VITALS — WEIGHT: 170.42 LBS | BODY MASS INDEX: 32.17 KG/M2 | HEIGHT: 61 IN

## 2021-01-23 VITALS — SYSTOLIC BLOOD PRESSURE: 119 MMHG | DIASTOLIC BLOOD PRESSURE: 69 MMHG

## 2021-01-23 VITALS — SYSTOLIC BLOOD PRESSURE: 114 MMHG | DIASTOLIC BLOOD PRESSURE: 68 MMHG

## 2021-01-23 VITALS — SYSTOLIC BLOOD PRESSURE: 121 MMHG | DIASTOLIC BLOOD PRESSURE: 79 MMHG

## 2021-01-23 DIAGNOSIS — Z91.013: ICD-10-CM

## 2021-01-23 DIAGNOSIS — Z86.711: ICD-10-CM

## 2021-01-23 DIAGNOSIS — R00.0: ICD-10-CM

## 2021-01-23 DIAGNOSIS — I48.91: ICD-10-CM

## 2021-01-23 DIAGNOSIS — Z91.018: ICD-10-CM

## 2021-01-23 DIAGNOSIS — Z3A.39: ICD-10-CM

## 2021-01-23 DIAGNOSIS — Z79.01: ICD-10-CM

## 2021-01-23 LAB
HCT VFR BLD AUTO: 26.4 % (ref 36–47)
HGB BLD-MCNC: 7.7 G/DL (ref 12–15.5)
MCH RBC QN AUTO: 21.6 PG (ref 27–33)
MCHC RBC AUTO-ENTMCNC: 29.2 G/DL (ref 32–36.5)
MCV RBC AUTO: 73.9 FL (ref 80–96)
PLATELET # BLD AUTO: 314 10^3/UL (ref 150–450)
RBC # BLD AUTO: 3.57 10^6/UL (ref 4–5.4)
WBC # BLD AUTO: 11.7 10^3/UL (ref 4–10)

## 2021-01-23 PROCEDURE — 3E0P7GC INTRODUCTION OF OTHER THERAPEUTIC SUBSTANCE INTO FEMALE REPRODUCTIVE, VIA NATURAL OR ARTIFICIAL OPENING: ICD-10-PCS | Performed by: OBSTETRICS & GYNECOLOGY

## 2021-01-23 RX ADMIN — SODIUM CHLORIDE, POTASSIUM CHLORIDE, SODIUM LACTATE AND CALCIUM CHLORIDE SCH MLS/HR: 600; 310; 30; 20 INJECTION, SOLUTION INTRAVENOUS at 12:17

## 2021-01-23 NOTE — HPEPDOC
Obstetrical History & Physical


General


Date of Admission


2021 at 08:23


Primary Care Physician:  Juan Pryor MD





History of Present Illness


19 YO  AT 39 WEEKS IOL WITH HEPARIN WINDOW HISTORY PE PRE DELIVERY LAST 

PREGNANCY


Chief Complaint:  Induction of labor


Information Provided By:  Patient


Age:  20


:  2


Term:  1


Pre-term:  0


Abortions:  0


Livin





Prenatal Care


Prenatal Care:  [facility]


Number of Prenatal Visits:  10





Prenatal Dating


Final EDC:  2021


Final EDC for Daily Update:  2021


Final EDC by:  LMP


LMP:  2020


1st Trimester Date:  2020


Weeks + Days:  7.1


Estimated Date of Confinement:  2021


EGA at Admission:  39





Antepartum Course


Prenatal Diagnos(e)s


39 WEEKS FOR IOL WITH HEPATIN WINDOW


Height (inches):  52


Pre-Pregnancy weight (lbs.):  133


Admission Weight (lbs.):  170


Change in Weight (lbs.):  47





Past Medical History


Past Obstetrical History :  


   Past Obstetrical History:  Multigravida


   Date of Delivery:  Oct 17, 2018


   Type of Delivery:  Spontaneous Vaginal Del.


   Sex of Infant:  Male


   Complications:  Yes (HEPARIN WINDOW HX PE)


GYN History:  No pertinent history


Past Medical History


Medical History


HISTORY PE LAST PREGNANCY AND THIS PREGNANCY ATRIAL FIB AND VENTRICULAR 

TACHYCARDIA


Surgical History:  Other (WISDOM TEETH)





Family History


Significant Family History:  Heart disease





Social History


Marital Status:  


Family situation:  Spouse/partner home


Psychosocial History:  No pertinent psych hx


* Smoker:  non-smoker


Alcohol:  Denies


Drugs:  denies





Abuse Violence Screening


Have you been hit/kicked/slapp:  No


Have you been sexually assault:  No





Prenatal Imunizations


Tdap status:  current


Influenza Status:  current





Allergies


Coded Allergies:  


     SEAFOOD (Verified  Allergy, Severe, anaphylaxis, 19)


     CILANTRO (Verified  Allergy, Intermediate, throat burning, 19)


     pineapple (Verified  Allergy, Intermediate, throat swelling, 19)


     Coconut (Verified  Allergy, Unknown, ITCHY, 21)


     garlic (Verified  Allergy, Unknown, 3/16/20)





Medications


Scheduled


Calcium Carbonate (Tums) 200 Mg Tab.chew, 2 TAB PO Q4H for HEARTBURN


Heparin Sodium,Porcine/Pf (Heparin 3 Unit/3 ml (1/ml) Syr) 1 Unit/1 Ml Syringe, 

1 UNIT IV BID





Physical Examination


Physical Examination


GENERAL: Alert and oriented times three.


BREAST: .


ABDOMEN: Gravid and non-tender to touch.


FETUS: Is vertex (VTX) by sterile vaginal examination (SVE), fetus is vertex 

(VTX) by Leopold.


HEART RATE: Regular rate and rhythm.


LUNGS: Clear to auscultation (CTA).


EXTREMITIES: No edema. No clonus. Deep tendon reflexes (DTRs) NORMAL


Other physical findings


SF HEIGHT 39 VERTEX BOWEL SOUNDS NORMAL NO EDEMA  REFLEXES NORMAL





Vital Signs/I&O





Vital Signs








  Date Time  Temp Pulse Resp B/P (MAP) Pulse Ox O2 Delivery O2 Flow Rate FiO2


 


21 09:11 98.0 115 20 117/73 (88)    











Laboratory Data


24H LABS


Laboratory Tests 2


21 08:29: Serology Scanned Report Hepatitis B Testing


21 10:26: Nucleated Red Blood Cells % (auto) 0.4H


CBC/BMP


Laboratory Tests


21 10:26











Pertinent Laboratoy Data


Blood Type:  A+


RBC Antibody Screen:  Negative


HIV:  Negative


Hepatitis B:  Negative


Rapid Plasma Reagin:  Nonreactive


Rubella:  Immune


Varicella:  Immune


Chlamydia/Gonorrhea:  Negative


Group B Streptococcus:  Negative


Cystic Fibrosis:  Negative





Anatomy Ultrasound


Placenta Location:  Anterior


Normal Anatomy:  Yes


Placenta Previa:  No





 Steroid Therapy


 Steroid Therapy:  No





Vaginal Examination


Dilation:  1cm


Effacement:  50%


Station:  -3


Cervical Consistency:  Firm


Cervical Position:  Posterior


Fetal Presentation:  Cephalic presentation





Fetal Assessment


Variability:  Moderate


Accelerations:  Present


Decelerations:  None





Tocometer


Contractions:  No





Assessment/Plan


Assessment


20 year-old  2 para (P 1  at 39  weeks by 7.5 -week ultrasound. Presents

to Labor and Delivery (L&D) FOR IOL WITH HEPARIN WINDOW .





Plan


Admit and orient.


 and consent.


Diet: PUMA 


Group B Streptococcus (GBS) [negative].


Labs and intravenous (IV) per unit protocol.


Counseled on Pitocin and induction of labor (IOL).


Lactated Ringers (LR): Bolus 1000 mL, then at 125 mL/hr.


Anticipate [normal spontaneous delivery ()].


C-S as appropriate.


REVIEWED IOL RISK HEMORRHAGE INFECTION RISK INCREASE CS FETAL NRFHT  FAILURE TO 

DELIVER VAGINALLY INCREASE RISK OF LACERATIONS NEED FOR REPAIR OF VAGINA 

URETHERA  BOWEL OR BLADDER . RISK OF  ADMISSION  OF BABY TO NICU. RISK OF USES F

ORCEPS VACUUM WITH  RISK OF HEMATOMA SUBDURAL  LACERATIONS





Labor and Delivery Counseling


PLAN OF CARE  START WITH CYTOTEC WHEN FAVORABLE USE OF COOK'S CATHETER AND 

PITOCIN, EPIDURAL AS NEEDED  SAFE TO PROCEED PATIENT EXPRESSED UNDERSTANDING OF 

PLAN OF CARE











Juan Pryor MD             2021 12:38

## 2021-01-24 VITALS — SYSTOLIC BLOOD PRESSURE: 101 MMHG | DIASTOLIC BLOOD PRESSURE: 55 MMHG

## 2021-01-24 VITALS — SYSTOLIC BLOOD PRESSURE: 124 MMHG | DIASTOLIC BLOOD PRESSURE: 66 MMHG

## 2021-01-24 VITALS — SYSTOLIC BLOOD PRESSURE: 127 MMHG | DIASTOLIC BLOOD PRESSURE: 69 MMHG

## 2021-01-24 VITALS — SYSTOLIC BLOOD PRESSURE: 135 MMHG | DIASTOLIC BLOOD PRESSURE: 62 MMHG

## 2021-01-24 VITALS — SYSTOLIC BLOOD PRESSURE: 114 MMHG | DIASTOLIC BLOOD PRESSURE: 55 MMHG

## 2021-01-24 VITALS — SYSTOLIC BLOOD PRESSURE: 111 MMHG | DIASTOLIC BLOOD PRESSURE: 55 MMHG

## 2021-01-24 VITALS — DIASTOLIC BLOOD PRESSURE: 76 MMHG | SYSTOLIC BLOOD PRESSURE: 121 MMHG

## 2021-01-24 VITALS — DIASTOLIC BLOOD PRESSURE: 59 MMHG | SYSTOLIC BLOOD PRESSURE: 119 MMHG

## 2021-01-24 VITALS — DIASTOLIC BLOOD PRESSURE: 87 MMHG | SYSTOLIC BLOOD PRESSURE: 140 MMHG

## 2021-01-24 VITALS — DIASTOLIC BLOOD PRESSURE: 69 MMHG | SYSTOLIC BLOOD PRESSURE: 132 MMHG

## 2021-01-24 VITALS — DIASTOLIC BLOOD PRESSURE: 59 MMHG | SYSTOLIC BLOOD PRESSURE: 108 MMHG

## 2021-01-24 VITALS — DIASTOLIC BLOOD PRESSURE: 62 MMHG | SYSTOLIC BLOOD PRESSURE: 121 MMHG

## 2021-01-24 VITALS — SYSTOLIC BLOOD PRESSURE: 139 MMHG | DIASTOLIC BLOOD PRESSURE: 74 MMHG

## 2021-01-24 VITALS — SYSTOLIC BLOOD PRESSURE: 113 MMHG | DIASTOLIC BLOOD PRESSURE: 74 MMHG

## 2021-01-24 VITALS — DIASTOLIC BLOOD PRESSURE: 68 MMHG | SYSTOLIC BLOOD PRESSURE: 123 MMHG

## 2021-01-24 VITALS — SYSTOLIC BLOOD PRESSURE: 127 MMHG | DIASTOLIC BLOOD PRESSURE: 60 MMHG

## 2021-01-24 VITALS — SYSTOLIC BLOOD PRESSURE: 131 MMHG | DIASTOLIC BLOOD PRESSURE: 68 MMHG

## 2021-01-24 VITALS — DIASTOLIC BLOOD PRESSURE: 58 MMHG | SYSTOLIC BLOOD PRESSURE: 121 MMHG

## 2021-01-24 VITALS — DIASTOLIC BLOOD PRESSURE: 60 MMHG | SYSTOLIC BLOOD PRESSURE: 121 MMHG

## 2021-01-24 VITALS — DIASTOLIC BLOOD PRESSURE: 65 MMHG | SYSTOLIC BLOOD PRESSURE: 135 MMHG

## 2021-01-24 VITALS — DIASTOLIC BLOOD PRESSURE: 56 MMHG | SYSTOLIC BLOOD PRESSURE: 111 MMHG

## 2021-01-24 VITALS — DIASTOLIC BLOOD PRESSURE: 57 MMHG | SYSTOLIC BLOOD PRESSURE: 105 MMHG

## 2021-01-24 VITALS — DIASTOLIC BLOOD PRESSURE: 54 MMHG | SYSTOLIC BLOOD PRESSURE: 98 MMHG

## 2021-01-24 VITALS — DIASTOLIC BLOOD PRESSURE: 55 MMHG | SYSTOLIC BLOOD PRESSURE: 100 MMHG

## 2021-01-24 VITALS — DIASTOLIC BLOOD PRESSURE: 58 MMHG | SYSTOLIC BLOOD PRESSURE: 110 MMHG

## 2021-01-24 VITALS — SYSTOLIC BLOOD PRESSURE: 114 MMHG | DIASTOLIC BLOOD PRESSURE: 58 MMHG

## 2021-01-24 VITALS — SYSTOLIC BLOOD PRESSURE: 123 MMHG | DIASTOLIC BLOOD PRESSURE: 74 MMHG

## 2021-01-24 VITALS — SYSTOLIC BLOOD PRESSURE: 110 MMHG | DIASTOLIC BLOOD PRESSURE: 62 MMHG

## 2021-01-24 VITALS — SYSTOLIC BLOOD PRESSURE: 132 MMHG | DIASTOLIC BLOOD PRESSURE: 76 MMHG

## 2021-01-24 VITALS — DIASTOLIC BLOOD PRESSURE: 57 MMHG | SYSTOLIC BLOOD PRESSURE: 98 MMHG

## 2021-01-24 VITALS — SYSTOLIC BLOOD PRESSURE: 142 MMHG | DIASTOLIC BLOOD PRESSURE: 69 MMHG

## 2021-01-24 VITALS — DIASTOLIC BLOOD PRESSURE: 56 MMHG | SYSTOLIC BLOOD PRESSURE: 117 MMHG

## 2021-01-24 LAB
BASE EXCESS BLDCOA CALC-SCNC: -4.6 MMOL/L
BASE EXCESS BLDCOV CALC-SCNC: -5.2 MMOL/L
CO2 BLDCOA CALC-SCNC: 21.7 MEQ/L
CO2 BLDCOV CALC-SCNC: 20.1 MEQ/L
HCO3 STD BLDCOA-SCNC: 19.7 MEQ/L
HCO3 STD BLDCOA-SCNC: 20.5 MEQ/L
HCO3 STD BLDCOV-SCNC: 19 MEQ/L
HCO3 STD BLDCOV-SCNC: 19.2 MEQ/L
PCO2 BLDCOA: 38.6 MMHG
PCO2 BLDCOV: 33.7 MMHG
PH BLDCOA: 7.34 UNITS
PH BLDCOV: 7.37 UNITS
PO2 BLDCOA: 24.6 MMHG
PO2 BLDCOV: 22.9 MMHG
SAO2 % BLDCOA: 58.2 %
SAO2 % BLDCOV: 54.4 %

## 2021-01-24 PROCEDURE — 10907ZC DRAINAGE OF AMNIOTIC FLUID, THERAPEUTIC FROM PRODUCTS OF CONCEPTION, VIA NATURAL OR ARTIFICIAL OPENING: ICD-10-PCS | Performed by: OBSTETRICS & GYNECOLOGY

## 2021-01-24 RX ADMIN — Medication SCH TAB: at 10:09

## 2021-01-24 RX ADMIN — ACETAMINOPHEN PRN MG: 325 TABLET ORAL at 10:25

## 2021-01-24 RX ADMIN — ACETAMINOPHEN PRN MG: 500 TABLET ORAL at 10:10

## 2021-01-24 RX ADMIN — ENOXAPARIN SODIUM SCH MG: 40 INJECTION SUBCUTANEOUS at 18:33

## 2021-01-24 RX ADMIN — SODIUM CHLORIDE, POTASSIUM CHLORIDE, SODIUM LACTATE AND CALCIUM CHLORIDE SCH MLS/HR: 600; 310; 30; 20 INJECTION, SOLUTION INTRAVENOUS at 01:17

## 2021-01-24 RX ADMIN — ACETAMINOPHEN PRN MG: 325 TABLET ORAL at 18:32

## 2021-01-24 NOTE — IPNPDOC
Text Note


Date of Service


The patient was seen on 1/24/21.





NOTE


01/24/2021 0320 AM  EPIDURAL IN PLACE  CONTRACTIONS SPACED OUT  AUGMENT WITH 

PITOCIN CATEGORY 1 STRIP  5 CM ANTERIOR 805 EFFACED -3  STATION  AROM CLEAR  

FLUID VETREX OT LEFT  PITOCIN AT 2 MUNITS SAFE TO PROCEED





VS,Fishbone, I+O


VS, Fishbone, I+O


Laboratory Tests


1/23/21 10:26











Vital Signs








  Date Time  Temp Pulse Resp B/P (MAP) Pulse Ox O2 Delivery O2 Flow Rate FiO2


 


1/24/21 01:29  88 16 108/59 (75)    


 


1/23/21 19:14 97.9       














I&O- Last 24 Hours up to 6 AM 


 


 1/24/21





 06:00


 


Intake Total 1787 ml


 


Output Total 350 ml


 


Balance 1437 ml

















Juan Pryor MD             Jan 24, 2021 03:21

## 2021-01-24 NOTE — DN
DELIVERY NOTE



DATE OF DELIVERY: 2021



DESCRIPTION OF DELIVERY: This lady is a 20-year-old  2, para 1, who was

admitted for induction of labor at 39 weeks, having a Heparin window. She has

had a history of past pulmonary embolism with the last delivery or

pre-delivery. She also has atrial fibrillation and ventricular tachycardia. She

was seen by cardiology and cleared. She had an epidural in place and had a

spontaneous vaginal delivery of a female infant weighing 8 pounds 5 ounces

(3760 grams). Apgars 8 and 9 at one and five minutes respectively. terminal 

meconium very scant. Arterial pH 7.34, base excess -4.6; venous pH 7.37, base

excess -5.2. Placenta delivered spontaneously thereafter with three-vessel

cord. Membranes and tissues intact. The uterus contracted well under Pitocin.

On examination, anterior, posterior and lateral walls were complete. Sphincter

was tight. No evidence of tears or lacerations. Again, the uterus was well

contracted under Pitocin. The patient and baby tolerating procedure well. 

JAI

## 2021-01-25 VITALS — SYSTOLIC BLOOD PRESSURE: 107 MMHG | DIASTOLIC BLOOD PRESSURE: 70 MMHG

## 2021-01-25 VITALS — DIASTOLIC BLOOD PRESSURE: 70 MMHG | SYSTOLIC BLOOD PRESSURE: 107 MMHG

## 2021-01-25 VITALS — SYSTOLIC BLOOD PRESSURE: 111 MMHG | DIASTOLIC BLOOD PRESSURE: 69 MMHG

## 2021-01-25 LAB
HCT VFR BLD AUTO: 26.5 % (ref 36–47)
HGB BLD-MCNC: 7.5 G/DL (ref 12–15.5)
MCH RBC QN AUTO: 21.2 PG (ref 27–33)
MCHC RBC AUTO-ENTMCNC: 28.3 G/DL (ref 32–36.5)
MCV RBC AUTO: 74.9 FL (ref 80–96)
PLATELET # BLD AUTO: 279 10^3/UL (ref 150–450)
RBC # BLD AUTO: 3.54 10^6/UL (ref 4–5.4)
WBC # BLD AUTO: 22.8 10^3/UL (ref 4–10)

## 2021-01-25 RX ADMIN — ACETAMINOPHEN PRN MG: 500 TABLET ORAL at 09:18

## 2021-01-25 RX ADMIN — ENOXAPARIN SODIUM SCH MG: 40 INJECTION SUBCUTANEOUS at 18:43

## 2021-01-25 RX ADMIN — ACETAMINOPHEN PRN MG: 325 TABLET ORAL at 21:34

## 2021-01-25 RX ADMIN — Medication SCH TAB: at 09:18

## 2021-01-25 NOTE — IPN
PROGRESS NOTE



DATE:  2021



SUBJECTIVE: This lady is a 20-year-old  2 now para 2, admitted at 39

weeks for induction of labor because of heparin window having a previous

history of pulmonary embolism with the last pregnancy. She also had atrial fib

and V-tach cleared by cardiology. She had a spontaneous vaginal delivery of a

female infant 8 pounds 5 ounces (3760 grams). Apgars of 8 and 9 at one and five

minutes respectively. Arterial pH 7.34, base excess -4.6; venous pH 7.37, base

excess -5.2. On her first postpartum day, she is feeling well. She is quite

pale. Her admitting hemoglobin was 7.7, hematocrit 26.4, and platelets were

314,000. We are awaiting her CBC as of this morning. We discussed phlebitis,

cystitis, mastitis, endometritis, cellulitis, diet, exercise, pain management;

perineal, breast and wound care. 



OBJECTIVE: Presently blood pressure is 107/70, respirations 16, pulse 75,

temperature 98.0. Uterus is 2 below. Lochia is moderate. Four quadrant bowel

sounds are noted and the perineum is intact. She has no rashes, lesions, or

pruritus. No arthralgia or myalgias. No complaint of joint pain. No cough,

wheeze, shortness of breath, or dyspnea on exertion. No bruising. 



ASSESSMENT/PLAN: She has had her one dose of Lovenox 12 hours post delivery.

She has medication at home to continue her Lovenox plus her postpartum

medication. Plans are for discharge tomorrow morning. We are awaiting her CBC.

As of 0600 this morning, the patient seems to be hemodynamically stable.

## 2021-01-26 VITALS — DIASTOLIC BLOOD PRESSURE: 62 MMHG | SYSTOLIC BLOOD PRESSURE: 109 MMHG

## 2021-01-26 RX ADMIN — ACETAMINOPHEN PRN MG: 325 TABLET ORAL at 07:42

## 2021-01-26 RX ADMIN — Medication SCH TAB: at 07:42

## 2021-01-26 NOTE — IPNPDOC
Postpartum Progress Note


Date of Service:  2021


Postpartum Day#:  2


Postpartum Progress Note


SUBJECT:This lady is a 20-year-old  2, para 2, who was admitted for 

induction of labor at 39 weeks, having a Heparin window. She has had a history 

of past pulmonary embolism with the last delivery or pre-delivery. She also has 

atrial fibrillation and ventricular tachycardia. She was seen by cardiology and 

cleared. She had an epidural in place and had a spontaneous vaginal delivery of 

a female infant weighing 8 pounds 5 ounces (3760 grams). Apgars 8 and 9 at one 

and five minutes respectively. today is PPD 2 and She has been ambulating, 

voiding spontaneously without issue and tolerating regular diet. Breast feeding 

without issue. Reports lochia is like a normal period. 





OBJECTIVE: 


VITAL SIGNS: Within normal limits, afebrile.


Alert and oriented times three.


Breath sounds clear to auscultation.


Heart rate: Regular rate and rhythm


Abdomen: Fundus firm at U-2.








ASSESSMENT:This lady is a 20-year-old  2, para 2, who was admitted for 

induction of labor at 39 weeks, having a Heparin window. She has had a history 

of past pulmonary embolism with the last delivery or pre-delivery. She also has 

atrial fibrillation and ventricular tachycardia. she was restarted on lovenox 

postpartum ( 12HRS AFTER DELIVERY) and is doing well on postpartum day 2. Vitals

within normal limits, afebrile, hemodynamically stable with no evidence of 

infection.





PLAN:


1. Discharge to home today.


2. Tylenol and Motrin for pain.


3. Encourage breast feeding and ambulation.


4. UNDECIDED for contraception for now- Discussed MIRENA IUD as a very good 

option for her medical history.


5. Routine PP visit in 6 weeks in clinic.


6. Discussed return precautions at length.





VS, I&O, 24H, Fishbone


Vital Signs/I&O





Vital Signs








  Date Time  Temp Pulse Resp B/P (MAP) Pulse Ox O2 Delivery O2 Flow Rate FiO2


 


21 18:00 97.2 83 14 111/69 (83)    


 


21 08:27     99 Room Air  











Laboratory Data


24H LABS


Laboratory Tests 2


21 07:35: Nucleated Red Blood Cells % (auto) 0.2H


CBC/BMP


Laboratory Tests


21 07:35

















SHUMBUSHO,SHEREE I MD           2021 05:37

## 2021-02-24 ENCOUNTER — HOSPITAL ENCOUNTER (EMERGENCY)
Dept: HOSPITAL 53 - M ED | Age: 21
Discharge: HOME | End: 2021-02-24
Payer: COMMERCIAL

## 2021-02-24 VITALS — HEIGHT: 61 IN | BODY MASS INDEX: 26.76 KG/M2 | WEIGHT: 141.76 LBS

## 2021-02-24 VITALS — SYSTOLIC BLOOD PRESSURE: 128 MMHG | DIASTOLIC BLOOD PRESSURE: 60 MMHG

## 2021-02-24 DIAGNOSIS — F33.9: ICD-10-CM

## 2021-02-24 DIAGNOSIS — J45.909: ICD-10-CM

## 2021-02-24 DIAGNOSIS — Z91.018: ICD-10-CM

## 2021-02-24 DIAGNOSIS — F41.9: ICD-10-CM

## 2021-02-24 DIAGNOSIS — R42: ICD-10-CM

## 2021-02-24 DIAGNOSIS — K21.9: ICD-10-CM

## 2021-02-24 DIAGNOSIS — R25.2: ICD-10-CM

## 2021-02-24 DIAGNOSIS — G43.909: ICD-10-CM

## 2021-02-24 DIAGNOSIS — Z79.51: ICD-10-CM

## 2021-02-24 DIAGNOSIS — Z91.013: ICD-10-CM

## 2021-02-24 DIAGNOSIS — R07.89: Primary | ICD-10-CM

## 2021-02-24 DIAGNOSIS — Z86.711: ICD-10-CM

## 2021-02-24 DIAGNOSIS — Z79.01: ICD-10-CM

## 2021-02-24 DIAGNOSIS — D64.9: ICD-10-CM

## 2021-02-24 LAB
ALBUMIN SERPL BCG-MCNC: 4.5 GM/DL (ref 3.2–5.2)
ALT SERPL W P-5'-P-CCNC: 31 U/L (ref 12–78)
APTT BLD: 31.6 SECONDS (ref 24.2–38.5)
B-HCG SERPL QL: NEGATIVE
BASOPHILS # BLD AUTO: 0.1 10^3/UL (ref 0–0.2)
BASOPHILS NFR BLD AUTO: 0.8 % (ref 0–1)
BILIRUB CONJ SERPL-MCNC: 0.1 MG/DL (ref 0–0.2)
BILIRUB SERPL-MCNC: 0.6 MG/DL (ref 0.2–1)
BUN SERPL-MCNC: 9 MG/DL (ref 7–18)
CALCIUM SERPL-MCNC: 9.7 MG/DL (ref 8.5–10.1)
CHLORIDE SERPL-SCNC: 108 MEQ/L (ref 98–107)
CO2 SERPL-SCNC: 25 MEQ/L (ref 21–32)
CREAT SERPL-MCNC: 0.85 MG/DL (ref 0.55–1.3)
D DIMER PPP DDU-MCNC: 279.82 NG/ML (ref ?–500)
EOSINOPHIL # BLD AUTO: 0.1 10^3/UL (ref 0–0.5)
EOSINOPHIL NFR BLD AUTO: 0.7 % (ref 0–3)
GFR SERPL CREATININE-BSD FRML MDRD: > 60 ML/MIN/{1.73_M2} (ref 60–?)
GLUCOSE SERPL-MCNC: 87 MG/DL (ref 70–100)
HCT VFR BLD AUTO: 36.7 % (ref 36–47)
HGB BLD-MCNC: 10.1 G/DL (ref 12–15.5)
INR PPP: 0.98
LIPASE SERPL-CCNC: 133 U/L (ref 73–393)
LYMPHOCYTES # BLD AUTO: 1.8 10^3/UL (ref 1.5–5)
LYMPHOCYTES NFR BLD AUTO: 20.3 % (ref 24–44)
MAGNESIUM SERPL-MCNC: 2.2 MG/DL (ref 1.8–2.4)
MCH RBC QN AUTO: 20.8 PG (ref 27–33)
MCHC RBC AUTO-ENTMCNC: 27.5 G/DL (ref 32–36.5)
MCV RBC AUTO: 75.5 FL (ref 80–96)
MONOCYTES # BLD AUTO: 0.8 10^3/UL (ref 0–0.8)
MONOCYTES NFR BLD AUTO: 8.6 % (ref 2–8)
NEUTROPHILS # BLD AUTO: 6.2 10^3/UL (ref 1.5–8.5)
NEUTROPHILS NFR BLD AUTO: 69.4 % (ref 36–66)
PLATELET # BLD AUTO: 423 10^3/UL (ref 150–450)
POTASSIUM SERPL-SCNC: 4.1 MEQ/L (ref 3.5–5.1)
PROT SERPL-MCNC: 8.5 GM/DL (ref 6.4–8.2)
PROTHROMBIN TIME: 13.2 SECONDS (ref 12.5–14.3)
RBC # BLD AUTO: 4.86 10^6/UL (ref 4–5.4)
SODIUM SERPL-SCNC: 140 MEQ/L (ref 136–145)
WBC # BLD AUTO: 8.9 10^3/UL (ref 4–10)

## 2021-02-24 NOTE — CCD
Summarization Of Episode

                             Created on: 2021



DEIRDRE CAREY

External Reference #: [id number]

: 2000

Sex: Female



Demographics





                          Address                   [address] [place] [address]  [address]

 

                          Home Phone                [phone]

 

                          Preferred Language        English

 

                          Marital Status            

 

                          Rastafari Affiliation     Hindu

 

                          Race                      White

 

                          Ethnic Group              Not  or 





Author





                          Author                    [facility]

 

                          Organization              [facility]

 

                          Address                   Unknown

 

                          Phone                     Unavailable







Support





                Name            Relationship    Address         Phone

 

                    SUPERWALM           Next Of Kin         [address]

[place], [place]  [address] [phone]

 

                UE              Next Of Kin     Unknown         Unavailable

 

                    LILLIE CAREY    Next Of Kin         [address] [place]  [address]                    [phone]

 

                    Deirdre Carey   [facility]                [address] [place]  [address]                    [phone]







Care Team Providers





                    Care Team Member Name Role                Phone

 

                    RAFIQ ZHOU MD  Unavailable         Unavailable

 

                    RAFIQ ZHOU MD  Unavailable         Unavailable

 

                    RAFIQ ZHOU MD  Unavailable         Unavailable

 

                    RAFIQ ZHOU MD  Unavailable         Unavailable

 

                    RAFIQ ZHOU MD  Unavailable         Unavailable

 

                    RAFIQ ZHOU MD  Unavailable         Unavailable

 

                    RAFIQ ZHOU MD  Unavailable         Unavailable

 

                    RAFIQ ZHOU MD  Unavailable         Unavailable

 

                    RAFIQ ZHOU MD  Unavailable         Unavailable

 

                    RAFIQ ZHOU MD  Unavailable         Unavailable

 

                    RAFIQ ZHOU MD  Unavailable         Unavailable

 

                    RAFIQ ZHOU MD  Unavailable         Unavailable

 

                    RAFIQ ZHOU MD  Unavailable         Unavailable

 

                    RAFIQ ZHOU MD  Unavailable         Unavailable

 

                    RAFIQ ZHOU MD  Unavailable         Unavailable

 

                    RAFIQ ZHOU MD  Unavailable         Unavailable

 

                    RAFIQ ZHOU MD  Unavailable         Unavailable

 

                    RAFIQ ZHOU MD  Unavailable         Unavailable

 

                    RAFIQ ZHOU MD  Unavailable         Unavailable

 

                    RAFIQ ZHOU MD  Unavailable         Unavailable

 

                    RAFIQ ZHOU MD  Unavailable         Unavailable

 

                    RAFIQ ZHOU MD  Unavailable         Unavailable

 

                    RAFIQ ZHOU MD  Unavailable         Unavailable

 

                    RAFIQ ZHOU MD  Unavailable         Unavailable

 

                    RAFIQ ZHOU MD  Unavailable         Unavailable

 

                    RAFIQ ZHOU MD  Unavailable         Unavailable

 

                    RAFIQ ZHOU MD  Unavailable         Unavailable

 

                    RAFIQ ZHOU MD  Unavailable         Unavailable

 

                    RAFIQ ZHOU MD  Unavailable         Unavailable

 

                    RAFIQ ZHOU MD  Unavailable         Unavailable

 

                    RAFIQ ZHOU MD  Unavailable         Unavailable

 

                    RAFIQ ZHOU MD  Unavailable         Unavailable

 

                    RAFIQ ZHOU MD  Unavailable         Unavailable

 

                    RAFIQ ZHOU MD  Unavailable         Unavailable

 

                    RAFIQ ZHOU MD  Unavailable         Unavailable

 

                    RAFIQ ZHOU MD  Unavailable         Unavailable

 

                    RAFIQ ZHOU MD  Unavailable         Unavailable

 

                    RAFIQ ZHOU MD  Unavailable         Unavailable

 

                    ZHOU, E BYRON MD  Unavailable         Unavailable

 

                    ZHOU, E BYRON MD  Unavailable         Unavailable

 

                    ZHOU, E BYRON MD  Unavailable         Unavailable

 

                    ZHOU, E BYRON MD  Unavailable         Unavailable

 

                    ZHOU, E BYRON MD  Unavailable         Unavailable

 

                    ZHOU, E BYRON MD  Unavailable         Unavailable

 

                    ZHOU, E BYRON MD  Unavailable         Unavailable

 

                    ZHOU, E BYRON MD  Unavailable         Unavailable

 

                    ZHOU, E BYRON MD  Unavailable         Unavailable

 

                    ZHOU, E BYRON MD  Unavailable         Unavailable

 

                    ZHOU, E BYRON MD  Unavailable         Unavailable

 

                    ZHOU, E BYRON MD  Unavailable         Unavailable

 

                    ZHOU, E BYRON MD  Unavailable         Unavailable

 

                    ZHOU, E BYRON MD  Unavailable         Unavailable

 

                    ZHOU, E BYRON MD  Unavailable         Unavailable

 

                    ZHOU, E BYRON MD  Unavailable         Unavailable

 

                    ZHOU, E BYRON MD  Unavailable         Unavailable

 

                    ZHOU, E BYRON MD  Unavailable         Unavailable

 

                    ZHOU, E BYRON MD  Unavailable         Unavailable

 

                    GUGA,  DURGA PA    Unavailable         Unavailable

 

                    GUGA,  DURGA PA    Unavailable         Unavailable

 

                    GUGA,  DURGA PA    Unavailable         Unavailable

 

                    GUGA,  DURGA PA    Unavailable         Unavailable

 

                    GUGA,  DURGA PA    Unavailable         Unavailable

 

                    GUGA,  DURGA PA    Unavailable         Unavailable



                                  



Re-disclosure Warning

          The records that you are about to access may contain information from 
federally-assisted alcohol or drug abuse programs. If such information is 
present, then the following federally mandated warning applies: This information
has been disclosed to you from records protected by federal confidentiality 
rules (42 CFR part 2). The federal rules prohibit you from making any further 
disclosure of this information unless further disclosure is expressly permitted 
by the written consent of the person to whom it pertains or as otherwise 
permitted by 42 CFR part 2. A general authorization for the release of medical 
or other information is NOT sufficient for this purpose. The Federal rules 
restrict any use of the information to criminally investigate or prosecute any 
alcohol or drug abuse patient.The records that you are about to access may 
contain highly sensitive health information, the redisclosure of which is 
protected by Article 27-F of the [facility] Public Health law. If you 
continue you may have access to information: Regarding HIV / AIDS; Provided by 
facilities licensed or operated by the [facility] Office of Mental Health; 
or Provided by the [facility] Office for People With Developmental 
Disabilities. If such information is present, then the following [place] 
mandated warning applies: This information has been disclosed to you from 
confidential records which are protected by state law. State law prohibits you 
from making any further disclosure of this information without the specific 
written consent of the person to whom it pertains, or as otherwise permitted by 
law. Any unauthorized further disclosure in violation of state law may result in
a fine or MCC sentence or both. A general authorization for the release of 
medical or other information is NOT sufficient authorization for further disc
losure.                                                                         
    



Encounters

          



           Encounter  Providers  Location   Date       Indications Data Source(s

)

 

           Outpatient Attender: BYRON ZHOU MD Main Office 2020 11:00:00 

AM EDT            

[facility] ([facility])

 

           Outpatient Referrer: DURGA BROOKS            2020 05:44:00 AM

 EDT            [facility] 

Radiology Imaging

 

           Outpatient Referrer: DURGA BROOKS            2020 05:22:00 AM

 EDT            [facility] 

Radiology Imaging



                                                                                
                           



Medications

          



          Medication Brand Name Start Date Product Form Dose      Route     Admi

nistrative 

Instructions Pharmacy Instructions Status     Indications Reaction   Description

 Data 

Source(s)

 

          Fluticasone Propionate Fluticasone Propionate 2020 12:00:00 AM E

DT                                

                      active                                      [facility] ([facility]

 

          Flonase Allergy Relief Flonase Allergy Relief 2020 12:00:00 AM E

DT                                

                      completed                                   [facility] ([facility]

 

        Loratadine 10 MG Oral Capsule Loratadine 2020 12:00:00 AM EDT     

            ORAL             

             active                                              [facility] ([facility]

[facility])

 

                Calcium Carbonate 500 MG Chewable Tablet [Tums] Tums            

2020 12:00:00 AM EDT 

                ORAL                    active                          [facility] (

[facility])

 

                    0.4 ML Enoxaparin sodium 100 MG/ML Prefilled Syringe [Loveno

x] Lovenox             

2020 12:00:00 AM EDT                                    active            

          [facility] ([facility]

 

     Tylenol Tylenol 2020 12:00:00 AM EDT                          active 

               [facility] 

([facility])

 

                    POLYETHYLENE GLYCOL 3350 142 MG/ML Oral Solution [Miralax] M

iralax             2020 

12:00:00 AM EDT               ORAL                 active                      M

[facility] ([facility])

 

                    Simethicone 125 MG Chewable Tablet Gas Relief Extra Strength

 2020 12:00:00

 AM EDT                                    active                      [facility] ([facility]

[facility])

 

          500 mg              2020 12:00:00 AM EDT tablet    40           

       TAKE ONE TABLET BY MOUTH FOUR 

TIMES A DAY FOR 10 DAYS   TAKE ONE TABLET BY MOUTH FOUR TIMES A DAY FOR 10 DAYS 

SOLD: 2020                                                 [facility]



                                                                                
                                                                    



Insurance Providers

          



             Payer name   Policy type / Coverage type Policy ID    Covered party

 ID Covered 

party's relationship to alston Policy [place]             Plan Information

 

           [employer]            [id number]           [id number]        

         [id number]

 

          [employer]  [place]         [id number]           S               

    [id number]

 

          SELF PAY  O         UNAVAILABLE           S                   UNAVAILA

BLE

 

           [employer]            [id number]           [id number]        

         [id number]

 

            [facility] [facility]           [id number]           HU2          

       [id number]

 

                       PI                                      PI

 

                       [id number]           Spo                 [id number]

 

           ACTIVE DUTY           [id number]           HU2                 

[id number]

 

           FOR LIFE O         [id number]           P                   [id number]

[id number]

 

                    O         UNAVAILABLE                               UNAVAILA

BLE



                                                                                
                                                                                
                  



Problems, Conditions, and Diagnoses

          



           Code       Display Name Description Problem Type Effective Dates Data

 Source(s)

 

           7183815    Tachycardia Tachycardia Problem    2020 12:00:00 AM 

EDT [facility] 

([facility])

 

             [id number]    H/O: pulmonary embolus H/O: pulmonary embolus Problem 

     2020 

12:00:00 AM EDT                         [facility] ([facility])

 

                [id number]        Asthma without status asthmaticus Asthma without

 status asthmaticus 

Problem                   2020 12:00:00 AM EDT [facility]

 

           [id number]   Migraine   Migraine   Problem    2020 12:00:00 AM ED

T [facility] ([facility]

 

           [id number]   Precordial pain Precordial pain Problem    2020 12:0

0:00 AM EDT 

[facility] ([facility])

 

             [id number]    Syncope and collapse Syncope and collapse Problem     

 2020 12:00:00 

AM EDT                                  [facility] ([facility])

 

             [id number]    Electrocardiogram abnormal Electrocardiogram abnormal 

Problem      

2020 12:00:00 AM EDT              [facility] ([facility])



                                                                                
                                                                              



Surgeries/Procedures

          



             Procedure    Description  Date         Indications  Data Source(s)

 

             CV STRS TST XERS&/OR RX CONT ECG PHYS SI&R              2020 

12:00:00 AM EDT              [facility]

 ([facility])

 

             XTRNL PT ACTIVATED ECG RECORD MONITOR 30 DAYS              20 12:00:00 AM EDT              

[facility] ([facility])

 

             XTRNL PT ACTIVTD ECG DWNLD 30 DAYS PHYS R&I              2020

 12:00:00 AM EDT              

[facility] ([facility])

 

             ECG ROUTINE ECG W/LEAST 12 LDS W/I&R              2020 12:00:

00 AM EDT              [facility] 

[facility]



                                                                                
                                      



Results

          



                    ID                  Date                Data Source

 

                    [id number]            2020 12:00:00 AM EST [facility]









          Name      Value     Range     Interpretation Code Description Data Danelle

rce(s) Supporting 

Document(s)

 

                                        SARS coronavirus 2 RNA [Presence] in Res

piratory specimen by NARESH with probe 

detection                                              [facility]      

 

                                        This lab was ordered by [facility] and reported by [employer]. 









                    ID                  Date                Data Source

 

                    [id number]            2020 12:00:00 AM EDT [facility]









          Name      Value     Range     Interpretation Code Description Data Danelle

rce(s) Supporting 

Document(s)

 

                                        SARS coronavirus 2 RNA [Presence] in Res

piratory specimen by NARESH with probe 

detection                                              [facility]      

 

                                        [facility] and reported by [employer]. 









                    ID                  Date                Data Source

 

                    [id number]            2020 11:55:00 AM EDT [facility] ([facility])









          Name      Value     Range     Interpretation Code Description Data Danelle

rce(s) Supporting 

Document(s)

 

          Hemoglobin 11.8                                    [facility] ([facility]  

 

          Platelets 320       172-450                       [facility]  

 

          White Blood Count 11.1      5.0-10.0                      [facility] ([facility]

[facility])  

 

          Red Blood Count 4.15      4.00-5.40                     [facility] ([facility]  

 

          Hematocrit 36.3                                    [facility] ([facility]  









                    ID                  Date                Data Source

 

                    [id number]            2020 11:55:00 AM EDT [facility] ([facility])









          Name      Value     Range     Interpretation Code Description Data Danelle

rce(s) Supporting 

Document(s)

 

          Glucose   86                                [facility]  

 

          Sodium    139       136-145                       [facility]  

 

          Blood Urea Nitrogen 7         7-18                          [facility] ([facility]

[facility]  

 

          Creatinine 0.4       0.6-1.0                       [facility] ([facility]  

 

          Potassium 4.0       3.5-5.1                       [facility]  

 

          Chloride  103                               [facility]  

 

          Carbon Dioxide 21.0      21-32                         [facility] ([facility]

[facility])  

 

                                        Glomerular filtration rate/1.73 sq M.pre

dicted [Volume Rate/Area] in Serum or 

Plasma by Creatinine-based formula (MDRD) Laboratory test result                

                        [facility] 

([facility])           

 

          Calcium   4.9       8.2-9.6                       [facility]  







                                        Procedure

 

                                          



                                                                                
                                               



Social History

          



           Code       Duration   Value      Status     Description Data Source(s

)

 

             Smoking      2020 12:00:00 AM EDT Patient has never smoked co

mpleted    Patient 

has never smoked                        [facility] ([facility])



                                                                                
                 



Vital Signs

          



                    ID                  Date                Data Source

 

                    UNK                                      









           Name       Value      Range      Interpretation Code Description Data

 Source(s)

 

           Diastolic blood pressure--standing 64 mm[Hg]                        6

4 mm[Hg]  [facility] ([facility]

 

                                        [place] 

 

           Systolic blood pressure--standing 96 mm[Hg]                        96

 mm[Hg]  [facility] ([facility]

 

                                        [place] 

 

           Diastolic blood pressure--supine 48 mm[Hg]                        48 

mm[Hg]  [facility] ([facility]

 

                                        Ra 

 

           Systolic blood pressure--supine 84 mm[Hg]                        84 m

m[Hg]  [facility] ([facility]

 

                                        Ra 

 

           Diastolic blood pressure--sitting 54 mm[Hg]                        54

 mm[Hg]  [facility] ([facility]

 

                                        Medium cuff, Ra; 92/60 LA 

 

           Systolic blood pressure--sitting 88 mm[Hg]                        88 

mm[Hg]  [facility] ([facility]

 

                                        Medium cuff, Ra; 92/60 LA 

 

           Respiratory rate 18 /min                          18 /min    [facility] (

[facility])

 

           Heart rate 88 /min                          88 /min    [facility] ([facility]

[facility])

 

                                        regular 

 

           Body mass index (BMI) [Ratio] 24.1 kg/m2                       24.1 k

g/m2 [facility] ([facility]

 

           Body height 62 [in_i]                        62 [in_i]  [facility] ([facility]

[facility])

 

                                        5'2" 

 

           Body weight 132.00 [lb_av]                       132.00 [lb_av] [facility]

T ([facility])

## 2021-02-24 NOTE — CCD
Summarization Of Episode

                             Created on: 2021



DEIRDRE CAREY

External Reference #: [id number]

: 2000

Sex: Female



Demographics





                          Address                   [address] [place] [address]  [address]

 

                          Home Phone                [phone]

 

                          Preferred Language        English

 

                          Marital Status            

 

                          Protestant Affiliation     Tenriism

 

                          Race                      White

 

                          Ethnic Group              Not  or 





Author





                          Author                    [facility]

 

                          Organization              [facility]

 

                          Address                   Unknown

 

                          Phone                     Unavailable







Support





                Name            Relationship    Address         Phone

 

                    SUPERWALM           Next Of Kin         [address]

[place], [place]  [address] [phone]

 

                UE              Next Of Kin     Unknown         Unavailable

 

                    LILLIE CAREY    Next Of Kin         [address] [place]  [address]                    [phone]

 

                    Deirdre Carey   [facility]                [address] [place]  [address]                    [phone]







Care Team Providers





                    Care Team Member Name Role                Phone

 

                    RAFIQ ZHOU MD  Unavailable         Unavailable

 

                    RAFIQ ZHOU MD  Unavailable         Unavailable

 

                    RAFIQ ZHOU MD  Unavailable         Unavailable

 

                    RAFIQ ZHOU MD  Unavailable         Unavailable

 

                    RAFIQ ZHOU MD  Unavailable         Unavailable

 

                    RAFIQ ZHOU MD  Unavailable         Unavailable

 

                    RAFIQ ZHOU MD  Unavailable         Unavailable

 

                    RAFIQ ZHOU MD  Unavailable         Unavailable

 

                    RAFIQ ZHOU MD  Unavailable         Unavailable

 

                    RAFIQ ZHOU MD  Unavailable         Unavailable

 

                    RAFIQ ZHOU MD  Unavailable         Unavailable

 

                    RAFIQ ZHOU MD  Unavailable         Unavailable

 

                    RAFIQ ZHOU MD  Unavailable         Unavailable

 

                    RAFIQ ZHOU MD  Unavailable         Unavailable

 

                    RAFIQ ZHOU MD  Unavailable         Unavailable

 

                    RAFIQ ZHOU MD  Unavailable         Unavailable

 

                    RAFIQ ZHOU MD  Unavailable         Unavailable

 

                    RAFIQ ZHOU MD  Unavailable         Unavailable

 

                    RAFIQ ZHOU MD  Unavailable         Unavailable

 

                    RAFIQ ZHOU MD  Unavailable         Unavailable

 

                    RAFIQ ZHOU MD  Unavailable         Unavailable

 

                    RAFIQ ZHOU MD  Unavailable         Unavailable

 

                    RAFIQ ZHOU MD  Unavailable         Unavailable

 

                    RAFIQ ZHOU MD  Unavailable         Unavailable

 

                    RAFIQ ZHOU MD  Unavailable         Unavailable

 

                    RAFIQ ZHOU MD  Unavailable         Unavailable

 

                    RAFIQ ZHOU MD  Unavailable         Unavailable

 

                    RAFIQ ZHOU MD  Unavailable         Unavailable

 

                    RAFIQ ZHOU MD  Unavailable         Unavailable

 

                    RAFIQ ZHOU MD  Unavailable         Unavailable

 

                    RAFIQ ZHOU MD  Unavailable         Unavailable

 

                    RAFIQ ZHOU MD  Unavailable         Unavailable

 

                    RAFIQ ZHOU MD  Unavailable         Unavailable

 

                    RAFIQ ZHOU MD  Unavailable         Unavailable

 

                    RAFIQ ZHOU MD  Unavailable         Unavailable

 

                    RAFIQ ZHOU MD  Unavailable         Unavailable

 

                    RAFIQ ZHOU MD  Unavailable         Unavailable

 

                    RAFIQ ZHOU MD  Unavailable         Unavailable

 

                    ZHOU, E BYRON MD  Unavailable         Unavailable

 

                    ZHOU, E BYRON MD  Unavailable         Unavailable

 

                    ZHOU, E BYRON MD  Unavailable         Unavailable

 

                    ZHOU, E BYRON MD  Unavailable         Unavailable

 

                    ZHOU, E BYRON MD  Unavailable         Unavailable

 

                    ZHOU, E BYRON MD  Unavailable         Unavailable

 

                    ZHOU, E BYRON MD  Unavailable         Unavailable

 

                    ZHOU, E BYRON MD  Unavailable         Unavailable

 

                    ZHOU, E BYRON MD  Unavailable         Unavailable

 

                    ZHOU, E BYRON MD  Unavailable         Unavailable

 

                    ZHOU, E BYRON MD  Unavailable         Unavailable

 

                    ZHOU, E BYRON MD  Unavailable         Unavailable

 

                    ZHOU, E BYRON MD  Unavailable         Unavailable

 

                    ZHOU, E BYRON MD  Unavailable         Unavailable

 

                    ZHOU, E BYRON MD  Unavailable         Unavailable

 

                    ZHOU, E BYRON MD  Unavailable         Unavailable

 

                    ZHOU, E BYRON MD  Unavailable         Unavailable

 

                    ZHOU, E BYRON MD  Unavailable         Unavailable

 

                    ZHOU, E BYRON MD  Unavailable         Unavailable

 

                    GUGA,  DURGA PA    Unavailable         Unavailable

 

                    GUGA,  DURGA PA    Unavailable         Unavailable

 

                    GUGA,  DURGA PA    Unavailable         Unavailable

 

                    GUGA,  DURGA PA    Unavailable         Unavailable

 

                    GUGA,  DURGA PA    Unavailable         Unavailable

 

                    GUGA,  DURGA PA    Unavailable         Unavailable



                                  



Re-disclosure Warning

          The records that you are about to access may contain information from 
federally-assisted alcohol or drug abuse programs. If such information is 
present, then the following federally mandated warning applies: This information
has been disclosed to you from records protected by federal confidentiality 
rules (42 CFR part 2). The federal rules prohibit you from making any further 
disclosure of this information unless further disclosure is expressly permitted 
by the written consent of the person to whom it pertains or as otherwise 
permitted by 42 CFR part 2. A general authorization for the release of medical 
or other information is NOT sufficient for this purpose. The Federal rules 
restrict any use of the information to criminally investigate or prosecute any 
alcohol or drug abuse patient.The records that you are about to access may 
contain highly sensitive health information, the redisclosure of which is 
protected by Article 27-F of the [facility] Public Health law. If you 
continue you may have access to information: Regarding HIV / AIDS; Provided by 
facilities licensed or operated by the [facility] Office of Mental Health; 
or Provided by the [facility] Office for People With Developmental 
Disabilities. If such information is present, then the following [place] 
mandated warning applies: This information has been disclosed to you from 
confidential records which are protected by state law. State law prohibits you 
from making any further disclosure of this information without the specific 
written consent of the person to whom it pertains, or as otherwise permitted by 
law. Any unauthorized further disclosure in violation of state law may result in
a fine or senior living sentence or both. A general authorization for the release of 
medical or other information is NOT sufficient authorization for further disc
losure.                                                                         
    



Encounters

          



           Encounter  Providers  Location   Date       Indications Data Source(s

)

 

           Outpatient Attender: BYRON ZHOU MD Main Office 2020 11:00:00 

AM EDT            

[facility] ([facility])

 

           Outpatient Referrer: DURGA BROOKS            2020 05:44:00 AM

 EDT            [facility] 

[facility]

 

           Outpatient Referrer: DURGA BROOKS            2020 05:22:00 AM

 EDT            [facility] 

[facility]



                                                                                
                           



Medications

          



          Medication Brand Name Start Date Product Form Dose      Route     Admi

nistrative 

Instructions Pharmacy Instructions Status     Indications Reaction   Description

 Data 

Source(s)

 

          Fluticasone Propionate Fluticasone Propionate 2020 12:00:00 AM E

DT                                

                      active                                      [facility] ([facility]

 

          Flonase Allergy Relief Flonase Allergy Relief 2020 12:00:00 AM E

DT                                

                      completed                                   [facility] ([facility])

 

        Loratadine 10 MG Oral Capsule Loratadine 2020 12:00:00 AM EDT     

            ORAL             

             active                                              [facility] ([facility]

[facility])

 

                Calcium Carbonate 500 MG Chewable Tablet [Tums] Tums            

2020 12:00:00 AM EDT 

                ORAL                    active                          [facility] (

[facility])

 

                    0.4 ML Enoxaparin sodium 100 MG/ML Prefilled Syringe [Loveno

x] Lovenox             

2020 12:00:00 AM EDT                                    active            

          [facility] ([facility])

 

     Tylenol Tylenol 2020 12:00:00 AM EDT                          active 

               [facility] 

([facility])

 

                    POLYETHYLENE GLYCOL 3350 142 MG/ML Oral Solution [Miralax] M

iralax             2020 

12:00:00 AM EDT               ORAL                 active                      M

[facility] ([facility])

 

                    Simethicone 125 MG Chewable Tablet Gas Relief Extra Strength

 2020 12:00:00

 AM EDT                                    active                      [facility] ([facility]

[facility])

 

          500 mg              2020 12:00:00 AM EDT tablet    40           

       TAKE ONE TABLET BY MOUTH FOUR 

TIMES A DAY FOR 10 DAYS   TAKE ONE TABLET BY MOUTH FOUR TIMES A DAY FOR 10 DAYS 

SOLD: 2020                                                 [facility]



                                                                                
                                                                    



Insurance Providers

          



             Payer name   Policy type / Coverage type Policy ID    Covered party

 ID Covered 

party's relationship to alston Policy [place]             Plan Information

 

           [employer]            [id number]           2        

         [id number]

 

          [employer]  [place]         [id number]           S               

    [id number]

 

          SELF PAY  O         UNAVAILABLE           S                   UNAVAILA

BLE

 

           [employer]            [id number]           HU2        

         [id number]

 

            [employer] [place]           [id number]           HU2          

       [id number]

 

                       PI                                      PI

 

                       [id number]           Spo                 [id number]

 

           ACTIVE DUTY           [id number]           HU2                 

[id number]

 

           FOR LIFE O         [id number]           P                   [id number]

[id number]

 

                    O         UNAVAILABLE                               UNAVAILA

BLE



                                                                                
                                                                                
                  



Problems, Conditions, and Diagnoses

          



           Code       Display Name Description Problem Type Effective Dates Data

 Source(s)

 

           5447579    Tachycardia Tachycardia Problem    2020 12:00:00 AM 

EDT [facility] 

([facility])

 

             [id number]    H/O: pulmonary embolus H/O: pulmonary embolus Problem 

     2020 

12:00:00 AM EDT                         [facility] ([facility])

 

                [id number]        Asthma without status asthmaticus Asthma without

 status asthmaticus 

Problem                   2020 12:00:00 AM EDT [facility]

 

           [id number]   Migraine   Migraine   Problem    2020 12:00:00 AM ED

T [facility] ([facility]

 

           [id number]   Precordial pain Precordial pain Problem    2020 12:0

0:00 AM EDT 

[facility] ([facility])

 

             [id number]    Syncope and collapse Syncope and collapse Problem     

 2020 12:00:00 

AM EDT                                  [facility] ([facility])

 

             [id number]    Electrocardiogram abnormal Electrocardiogram abnormal 

Problem      

2020 12:00:00 AM EDT              [facility] ([facility])



                                                                                
                                                                              



Surgeries/Procedures

          



             Procedure    Description  Date         Indications  Data Source(s)

 

             CV STRS TST XERS&/OR RX CONT ECG PHYS SI&R              2020 

12:00:00 AM EDT              [facility]

 ([facility])

 

             XTRNL PT ACTIVATED ECG RECORD MONITOR 30 DAYS              20 12:00:00 AM EDT              

[facility] ([facility])

 

             XTRNL PT ACTIVTD ECG DWNLD 30 DAYS PHYS R&I              2020

 12:00:00 AM EDT              

[facility] ([facility])

 

             ECG ROUTINE ECG W/LEAST 12 LDS W/I&R              2020 12:00:

00 AM EDT              [facility]



                                                                                
                                      



Results

          



                    ID                  Date                Data Source

 

                    [id number]            2020 12:00:00 AM EST [facility]









          Name      Value     Range     Interpretation Code Description Data Danelle

rce(s) Supporting 

Document(s)

 

                                        SARS coronavirus 2 RNA [Presence] in Res

piratory specimen by NARESH with probe 

detection                                              [facility]      

 

                                        This lab was ordered by [facility] [employer] [facility] and reported by [employer]. 









                    ID                  Date                Data Source

 

                    [id number]            2020 12:00:00 AM EDT [facility]









          Name      Value     Range     Interpretation Code Description Data Danelle

rce(s) Supporting 

Document(s)

 

                                        SARS coronavirus 2 RNA [Presence] in Res

piratory specimen by NARESH with probe 

detection                                              [facility]      

 

                                        [facility] [employer] [facility] and reported by [employer]. 









                    ID                  Date                Data Source

 

                    [id number]            2020 11:55:00 AM EDT [facility] ([facility])









          Name      Value     Range     Interpretation Code Description Data Danelle

rce(s) Supporting 

Document(s)

 

          Hemoglobin 11.8                                    [facility] ([facility]  

 

          Platelets 320       172-450                       [facility]  

 

          White Blood Count 11.1      5.0-10.0                      [facility] ([facility]

[facility])  

 

          Red Blood Count 4.15      4.00-5.40                     [facility] ([facility])  

 

          Hematocrit 36.3                                    [facility] ([facility]  









                    ID                  Date                Data Source

 

                    [id number]            2020 11:55:00 AM EDT [facility] ([facility])









          Name      Value     Range     Interpretation Code Description Data Danelle

rce(s) Supporting 

Document(s)

 

          Glucose   86                                [facility]  

 

          Sodium    139       136-145                       [facility]  

 

          Blood Urea Nitrogen 7         7-18                          [facility] ([facility]

[facility]  

 

          Creatinine 0.4       0.6-1.0                       [facility]  

 

          Potassium 4.0       3.5-5.1                       [facility]  

 

          Chloride  103                               [facility]  

 

          Carbon Dioxide 21.0      21-32                         [facility] ([facility]

[facility])  

 

                                        Glomerular filtration rate/1.73 sq M.pre

dicted [Volume Rate/Area] in Serum or 

Plasma by Creatinine-based formula (MDRD) Laboratory test result                

                        [facility] 

([facility])           

 

          Calcium   4.9       8.2-9.6                       [facility]  







                                        Procedure

 

                                          



                                                                                
                                               



Social History

          



           Code       Duration   Value      Status     Description Data Source(s

)

 

             Smoking      2020 12:00:00 AM EDT Patient has never smoked co

mpleted    Patient 

has never smoked                        [facility] ([facility])



                                                                                
                 



Vital Signs

          



                    ID                  Date                Data Source

 

                    UNK                                      









           Name       Value      Range      Interpretation Code Description Data

 Source(s)

 

           Diastolic blood pressure--standing 64 mm[Hg]                        6

4 mm[Hg]  [facility] ([facility]

 

                                        [place] 

 

           Systolic blood pressure--standing 96 mm[Hg]                        96

 mm[Hg]  [facility] ([facility]

 

                                        [place] 

 

           Diastolic blood pressure--supine 48 mm[Hg]                        48 

mm[Hg]  [facility] ([facility]

 

                                        Ra 

 

           Systolic blood pressure--supine 84 mm[Hg]                        84 m

m[Hg]  [facility] ([facility]

 

                                        Ra 

 

           Diastolic blood pressure--sitting 54 mm[Hg]                        54

 mm[Hg]  [facility]

 

                                        Medium cuff, Ra; 92/60 LA 

 

           Systolic blood pressure--sitting 88 mm[Hg]                        88 

mm[Hg]  [facility] ([facility]

 

                                        Medium cuff, Ra; 92/60 LA 

 

           Respiratory rate 18 /min                          18 /min    [facility] (

[facility])

 

           Heart rate 88 /min                          88 /min    [facility] ([facility])

 

                                        regular 

 

           Body mass index (BMI) [Ratio] 24.1 kg/m2                       24.1 k

g/m2 [facility] ([facility]

 

           Body height 62 [in_i]                        62 [in_i]  [facility] ([facility]

[facility])

 

                                        5'2" 

 

           Body weight 132.00 [lb_av]                       132.00 [lb_av] [facility]

T ([facility])

## 2021-02-24 NOTE — REPVR
PROCEDURE INFORMATION: 

Exam: US Duplex Lower Extremity Veins, Bilateral 

Exam date and time: 2/24/2021 10:52 PM 

Age: 21 years old 

Clinical indication: Pain; Leg, lower; Bilateral; Additional info: Bilat leg 

cramping, HX of clots 



TECHNIQUE: 

Imaging protocol: Real-time duplex ultrasound of the extremities with 2-D gray 

scale, color Doppler flow and spectral waveform analysis with image 

documentation. Complete exam focused on the bilateral lower extremity veins. 



COMPARISON: 

US Duplex, Ext LOWER veins, bilat 6/16/2020 5:45 PM 



FINDINGS: 

Right deep veins: Unremarkable. The common femoral, femoral, proximal profunda 

femoral and popliteal veins are patent without thrombus. Normal Doppler 

waveforms. Normal compressibility and/or augmentation response.  

Right superficial veins: Saphenofemoral junction is patent without thrombus. 



Left deep veins: Unremarkable. The common femoral, femoral, proximal profunda 

femoral and popliteal veins are patent without thrombus. Normal Doppler 

waveforms. Normal compressibility and/or augmentation response.  

Left superficial veins: Saphenofemoral junction is patent without thrombus. 



Soft tissues: Unremarkable. 



IMPRESSION: 

No evidence of deep vein thrombosis. 



Electronically signed by: Sachin Troncoso On 02/24/2021  23:30:56 PM

## 2021-05-20 ENCOUNTER — HOSPITAL ENCOUNTER (EMERGENCY)
Dept: HOSPITAL 53 - M ED | Age: 21
Discharge: HOME | End: 2021-05-20
Payer: COMMERCIAL

## 2021-05-20 VITALS — DIASTOLIC BLOOD PRESSURE: 90 MMHG | SYSTOLIC BLOOD PRESSURE: 140 MMHG

## 2021-05-20 DIAGNOSIS — F41.0: Primary | ICD-10-CM

## 2021-05-20 DIAGNOSIS — Z91.013: ICD-10-CM

## 2021-05-20 DIAGNOSIS — Z91.018: ICD-10-CM

## 2021-06-05 ENCOUNTER — HOSPITAL ENCOUNTER (EMERGENCY)
Dept: HOSPITAL 53 - M ED | Age: 21
LOS: 1 days | Discharge: HOME | End: 2021-06-06
Payer: COMMERCIAL

## 2021-06-05 VITALS — BODY MASS INDEX: 23.43 KG/M2 | WEIGHT: 124.12 LBS | HEIGHT: 61 IN

## 2021-06-05 DIAGNOSIS — Z91.013: ICD-10-CM

## 2021-06-05 DIAGNOSIS — Z91.018: ICD-10-CM

## 2021-06-05 DIAGNOSIS — K59.00: Primary | ICD-10-CM

## 2021-06-05 DIAGNOSIS — F41.1: ICD-10-CM

## 2021-06-06 VITALS — DIASTOLIC BLOOD PRESSURE: 90 MMHG | SYSTOLIC BLOOD PRESSURE: 145 MMHG

## 2021-06-06 LAB
ALBUMIN SERPL BCG-MCNC: 4.3 GM/DL (ref 3.2–5.2)
ALT SERPL W P-5'-P-CCNC: 26 U/L (ref 12–78)
BASOPHILS # BLD AUTO: 0.1 10^3/UL (ref 0–0.2)
BASOPHILS NFR BLD AUTO: 1 % (ref 0–1)
BILIRUB CONJ SERPL-MCNC: 0.2 MG/DL (ref 0–0.2)
BILIRUB SERPL-MCNC: 0.7 MG/DL (ref 0.2–1)
BUN SERPL-MCNC: 11 MG/DL (ref 7–18)
CALCIUM SERPL-MCNC: 9.5 MG/DL (ref 8.5–10.1)
CHLORIDE SERPL-SCNC: 104 MEQ/L (ref 98–107)
CO2 SERPL-SCNC: 26 MEQ/L (ref 21–32)
CREAT SERPL-MCNC: 0.64 MG/DL (ref 0.55–1.3)
EOSINOPHIL # BLD AUTO: 0.1 10^3/UL (ref 0–0.5)
EOSINOPHIL NFR BLD AUTO: 1.2 % (ref 0–3)
GFR SERPL CREATININE-BSD FRML MDRD: > 60 ML/MIN/{1.73_M2} (ref 60–?)
GLUCOSE SERPL-MCNC: 78 MG/DL (ref 70–100)
HCT VFR BLD AUTO: 33.5 % (ref 36–47)
HGB BLD-MCNC: 10 G/DL (ref 12–15.5)
LIPASE SERPL-CCNC: 147 U/L (ref 73–393)
LYMPHOCYTES # BLD AUTO: 2.6 10^3/UL (ref 1.5–5)
LYMPHOCYTES NFR BLD AUTO: 34.2 % (ref 24–44)
MCH RBC QN AUTO: 21.7 PG (ref 27–33)
MCHC RBC AUTO-ENTMCNC: 29.9 G/DL (ref 32–36.5)
MCV RBC AUTO: 72.7 FL (ref 80–96)
MONOCYTES # BLD AUTO: 1 10^3/UL (ref 0–0.8)
MONOCYTES NFR BLD AUTO: 12.3 % (ref 2–8)
NEUTROPHILS # BLD AUTO: 4 10^3/UL (ref 1.5–8.5)
NEUTROPHILS NFR BLD AUTO: 51.2 % (ref 36–66)
PLATELET # BLD AUTO: 375 10^3/UL (ref 150–450)
POTASSIUM SERPL-SCNC: 3.6 MEQ/L (ref 3.5–5.1)
PROT SERPL-MCNC: 7.8 GM/DL (ref 6.4–8.2)
RBC # BLD AUTO: 4.61 10^6/UL (ref 4–5.4)
SODIUM SERPL-SCNC: 139 MEQ/L (ref 136–145)
WBC # BLD AUTO: 7.7 10^3/UL (ref 4–10)

## 2021-06-06 NOTE — REPVR
PROCEDURE INFORMATION: 

Exam: XR Abdomen 

Exam date and time: 6/6/2021 3:02 AM 

Age: 21 years old 

Clinical indication: Other: Llq abd pain, constipation 



TECHNIQUE: 

Imaging protocol: XR of the abdomen. 

Views: Frontal supine view of the abdomen. 1 View. 



COMPARISON: 

1ST TRIMESTER US 5/20/2020 1:31 AM 



FINDINGS: 

Gastrointestinal tract: Unremarkable bowel gas pattern. No gaseous distention 

to suggest obstruction. Moderate to large amount of fecal material within the 

mid colon. 

Bones/joints: Unremarkable. 

Soft tissues: Unremarkable. 



IMPRESSION: 

Moderate to large amount of fecal material within the mid colon. No 

obstruction. 



Electronically signed by: Johan Reaves On 06/06/2021  04:03:30 AM

## 2021-08-11 ENCOUNTER — HOSPITAL ENCOUNTER (EMERGENCY)
Dept: HOSPITAL 53 - M ED | Age: 21
Discharge: HOME | End: 2021-08-11
Payer: COMMERCIAL

## 2021-08-11 VITALS — WEIGHT: 110.89 LBS | BODY MASS INDEX: 20.94 KG/M2 | HEIGHT: 61 IN

## 2021-08-11 VITALS — SYSTOLIC BLOOD PRESSURE: 117 MMHG | DIASTOLIC BLOOD PRESSURE: 87 MMHG

## 2021-08-11 DIAGNOSIS — Z86.711: ICD-10-CM

## 2021-08-11 DIAGNOSIS — Z79.899: ICD-10-CM

## 2021-08-11 DIAGNOSIS — D64.9: ICD-10-CM

## 2021-08-11 DIAGNOSIS — Z91.018: ICD-10-CM

## 2021-08-11 DIAGNOSIS — F32.9: ICD-10-CM

## 2021-08-11 DIAGNOSIS — Z91.013: ICD-10-CM

## 2021-08-11 DIAGNOSIS — D25.9: ICD-10-CM

## 2021-08-11 DIAGNOSIS — G43.909: ICD-10-CM

## 2021-08-11 DIAGNOSIS — R10.2: Primary | ICD-10-CM

## 2021-08-11 DIAGNOSIS — F41.9: ICD-10-CM

## 2021-08-11 LAB
B-HCG SERPL-ACNC: < 1 MIU/ML
BASOPHILS # BLD AUTO: 0.1 10^3/UL (ref 0–0.2)
BASOPHILS NFR BLD AUTO: 0.7 % (ref 0–1)
BUN SERPL-MCNC: 12 MG/DL (ref 7–18)
CALCIUM SERPL-MCNC: 8.8 MG/DL (ref 8.5–10.1)
CHLORIDE SERPL-SCNC: 110 MEQ/L (ref 98–107)
CO2 SERPL-SCNC: 25 MEQ/L (ref 21–32)
CREAT SERPL-MCNC: 0.53 MG/DL (ref 0.55–1.3)
EOSINOPHIL # BLD AUTO: 0.1 10^3/UL (ref 0–0.5)
EOSINOPHIL NFR BLD AUTO: 1.6 % (ref 0–3)
GFR SERPL CREATININE-BSD FRML MDRD: > 60 ML/MIN/{1.73_M2} (ref 60–?)
GLUCOSE SERPL-MCNC: 78 MG/DL (ref 70–100)
HCT VFR BLD AUTO: 33 % (ref 36–47)
HGB BLD-MCNC: 10.2 G/DL (ref 12–15.5)
LYMPHOCYTES # BLD AUTO: 3.1 10^3/UL (ref 1.5–5)
LYMPHOCYTES NFR BLD AUTO: 37.7 % (ref 24–44)
MCH RBC QN AUTO: 24.1 PG (ref 27–33)
MCHC RBC AUTO-ENTMCNC: 30.9 G/DL (ref 32–36.5)
MCV RBC AUTO: 78 FL (ref 80–96)
MONOCYTES # BLD AUTO: 1 10^3/UL (ref 0–0.8)
MONOCYTES NFR BLD AUTO: 12.6 % (ref 2–8)
N GONORRHOEA RRNA SPEC QL NAA+PROBE: NEGATIVE
NEUTROPHILS # BLD AUTO: 3.9 10^3/UL (ref 1.5–8.5)
NEUTROPHILS NFR BLD AUTO: 47.3 % (ref 36–66)
PLATELET # BLD AUTO: 354 10^3/UL (ref 150–450)
POTASSIUM SERPL-SCNC: 4 MEQ/L (ref 3.5–5.1)
RBC # BLD AUTO: 4.23 10^6/UL (ref 4–5.4)
SODIUM SERPL-SCNC: 143 MEQ/L (ref 136–145)
WBC # BLD AUTO: 8.3 10^3/UL (ref 4–10)

## 2021-08-11 NOTE — REPVR
PROCEDURE INFORMATION: 

Exam: US Nonobstetric Pelvis; Complete 

Exam date and time: 8/11/2021 6:47 AM 

Age: 21 years old 

Clinical indication: Pelvic pain; Additional info: Bilateral pelvic pain, l>r 



TECHNIQUE: 

Imaging protocol: Transabdominal pelvic nonobstetric ultrasound. Complete exam. 

Real time ultrasound with image documentation. 



COMPARISON: 

US OBS SINGEL GEST 8/2/2020 4:57 PM 



FINDINGS: 

Uterus/cervix: Uterus is anteverted at 7.5 x 5.5 x 4.5 cm without focal mass. 

The endometrium is smooth at 4 mm. 

Right adnexa: The right ovary is unremarkable at 2.8 cm.  Preserved arterial 

and venous flow.

Left adnexa: The left ovary is unremarkable at 2.7 cm.  Preserved arterial and 

venous flow.

Intraperitoneal space: No significant free fluid. 

Urinary bladder: The urinary bladder is collapsed. 



IMPRESSION: 

No acute abnormality and no ultrasound explanation for the patient's pain.



Electronically signed by: Earl Cid On 08/11/2021  08:26:30 AM